# Patient Record
Sex: MALE | Race: BLACK OR AFRICAN AMERICAN | NOT HISPANIC OR LATINO | URBAN - METROPOLITAN AREA
[De-identification: names, ages, dates, MRNs, and addresses within clinical notes are randomized per-mention and may not be internally consistent; named-entity substitution may affect disease eponyms.]

---

## 2019-12-29 ENCOUNTER — INPATIENT (INPATIENT)
Facility: HOSPITAL | Age: 69
LOS: 1 days | Discharge: HOME | End: 2019-12-31
Attending: HOSPITALIST | Admitting: HOSPITALIST
Payer: MEDICAID

## 2019-12-29 VITALS
SYSTOLIC BLOOD PRESSURE: 172 MMHG | HEART RATE: 128 BPM | TEMPERATURE: 98 F | DIASTOLIC BLOOD PRESSURE: 82 MMHG | RESPIRATION RATE: 18 BRPM | OXYGEN SATURATION: 96 %

## 2019-12-29 DIAGNOSIS — N39.0 URINARY TRACT INFECTION, SITE NOT SPECIFIED: ICD-10-CM

## 2019-12-29 DIAGNOSIS — A41.9 SEPSIS, UNSPECIFIED ORGANISM: ICD-10-CM

## 2019-12-29 DIAGNOSIS — M62.82 RHABDOMYOLYSIS: ICD-10-CM

## 2019-12-29 DIAGNOSIS — J11.1 INFLUENZA DUE TO UNIDENTIFIED INFLUENZA VIRUS WITH OTHER RESPIRATORY MANIFESTATIONS: ICD-10-CM

## 2019-12-29 DIAGNOSIS — Z02.9 ENCOUNTER FOR ADMINISTRATIVE EXAMINATIONS, UNSPECIFIED: ICD-10-CM

## 2019-12-29 DIAGNOSIS — R74.0 NONSPECIFIC ELEVATION OF LEVELS OF TRANSAMINASE AND LACTIC ACID DEHYDROGENASE [LDH]: ICD-10-CM

## 2019-12-29 DIAGNOSIS — E11.9 TYPE 2 DIABETES MELLITUS WITHOUT COMPLICATIONS: ICD-10-CM

## 2019-12-29 DIAGNOSIS — N17.9 ACUTE KIDNEY FAILURE, UNSPECIFIED: ICD-10-CM

## 2019-12-29 DIAGNOSIS — G93.41 METABOLIC ENCEPHALOPATHY: ICD-10-CM

## 2019-12-29 DIAGNOSIS — R79.89 OTHER SPECIFIED ABNORMAL FINDINGS OF BLOOD CHEMISTRY: ICD-10-CM

## 2019-12-29 LAB
ALBUMIN SERPL ELPH-MCNC: 4.6 G/DL — SIGNIFICANT CHANGE UP (ref 3.3–5)
ALP SERPL-CCNC: 66 U/L — SIGNIFICANT CHANGE UP (ref 40–120)
ALT FLD-CCNC: 29 U/L — SIGNIFICANT CHANGE UP (ref 4–41)
AMMONIA BLD-MCNC: 30 UMOL/L — SIGNIFICANT CHANGE UP (ref 11–55)
ANION GAP SERPL CALC-SCNC: 16 MMO/L — HIGH (ref 7–14)
ANISOCYTOSIS BLD QL: SLIGHT — SIGNIFICANT CHANGE UP
APPEARANCE UR: CLEAR — SIGNIFICANT CHANGE UP
APTT BLD: 29.1 SEC — SIGNIFICANT CHANGE UP (ref 27.5–36.3)
AST SERPL-CCNC: 69 U/L — HIGH (ref 4–40)
B PERT DNA SPEC QL NAA+PROBE: NOT DETECTED — SIGNIFICANT CHANGE UP
BACTERIA # UR AUTO: NEGATIVE — SIGNIFICANT CHANGE UP
BASE EXCESS BLDV CALC-SCNC: 1.1 MMOL/L — SIGNIFICANT CHANGE UP
BASE EXCESS BLDV CALC-SCNC: 3.7 MMOL/L — SIGNIFICANT CHANGE UP
BASOPHILS # BLD AUTO: 0.02 K/UL — SIGNIFICANT CHANGE UP (ref 0–0.2)
BASOPHILS NFR BLD AUTO: 0.2 % — SIGNIFICANT CHANGE UP (ref 0–2)
BASOPHILS NFR SPEC: 0 % — SIGNIFICANT CHANGE UP (ref 0–2)
BILIRUB SERPL-MCNC: 1.7 MG/DL — HIGH (ref 0.2–1.2)
BILIRUB UR-MCNC: NEGATIVE — SIGNIFICANT CHANGE UP
BLASTS # FLD: 0 % — SIGNIFICANT CHANGE UP (ref 0–0)
BLOOD GAS VENOUS - CREATININE: 1.31 MG/DL — HIGH (ref 0.5–1.3)
BLOOD GAS VENOUS - CREATININE: 1.38 MG/DL — HIGH (ref 0.5–1.3)
BLOOD UR QL VISUAL: HIGH
BUN SERPL-MCNC: 22 MG/DL — SIGNIFICANT CHANGE UP (ref 7–23)
C PNEUM DNA SPEC QL NAA+PROBE: NOT DETECTED — SIGNIFICANT CHANGE UP
CALCIUM SERPL-MCNC: 10.3 MG/DL — SIGNIFICANT CHANGE UP (ref 8.4–10.5)
CHLORIDE BLDV-SCNC: 102 MMOL/L — SIGNIFICANT CHANGE UP (ref 96–108)
CHLORIDE BLDV-SCNC: 93 MMOL/L — LOW (ref 96–108)
CHLORIDE SERPL-SCNC: 95 MMOL/L — LOW (ref 98–107)
CK MB BLD-MCNC: 0.1 — SIGNIFICANT CHANGE UP (ref 0–2.5)
CK MB BLD-MCNC: 3.63 NG/ML — SIGNIFICANT CHANGE UP (ref 1–6.6)
CK SERPL-CCNC: 2425 U/L — HIGH (ref 30–200)
CO2 SERPL-SCNC: 25 MMOL/L — SIGNIFICANT CHANGE UP (ref 22–31)
COHGB MFR BLDV: 1 % — SIGNIFICANT CHANGE UP (ref 0.5–1.5)
COHGB MFR BLDV: 12 G/DL — LOW (ref 13–17)
COLOR SPEC: SIGNIFICANT CHANGE UP
CREAT SERPL-MCNC: 1.41 MG/DL — HIGH (ref 0.5–1.3)
EOSINOPHIL # BLD AUTO: 0 K/UL — SIGNIFICANT CHANGE UP (ref 0–0.5)
EOSINOPHIL NFR BLD AUTO: 0 % — SIGNIFICANT CHANGE UP (ref 0–6)
EOSINOPHIL NFR FLD: 0 % — SIGNIFICANT CHANGE UP (ref 0–6)
FLUAV H1 2009 PAND RNA SPEC QL NAA+PROBE: NOT DETECTED — SIGNIFICANT CHANGE UP
FLUAV H1 RNA SPEC QL NAA+PROBE: NOT DETECTED — SIGNIFICANT CHANGE UP
FLUAV H3 RNA SPEC QL NAA+PROBE: DETECTED — HIGH
FLUBV RNA SPEC QL NAA+PROBE: NOT DETECTED — SIGNIFICANT CHANGE UP
GAS PNL BLDV: 132 MMOL/L — LOW (ref 136–146)
GAS PNL BLDV: 135 MMOL/L — LOW (ref 136–146)
GIANT PLATELETS BLD QL SMEAR: PRESENT — SIGNIFICANT CHANGE UP
GLUCOSE BLDC GLUCOMTR-MCNC: 257 MG/DL — HIGH (ref 70–99)
GLUCOSE BLDV-MCNC: 346 MG/DL — HIGH (ref 70–99)
GLUCOSE BLDV-MCNC: 352 MG/DL — HIGH (ref 70–99)
GLUCOSE SERPL-MCNC: 345 MG/DL — HIGH (ref 70–99)
GLUCOSE UR-MCNC: >1000 — HIGH
HADV DNA SPEC QL NAA+PROBE: NOT DETECTED — SIGNIFICANT CHANGE UP
HCO3 BLDV-SCNC: 24 MMOL/L — SIGNIFICANT CHANGE UP (ref 20–27)
HCO3 BLDV-SCNC: 25 MMOL/L — SIGNIFICANT CHANGE UP (ref 20–27)
HCOV PNL SPEC NAA+PROBE: SIGNIFICANT CHANGE UP
HCT VFR BLD CALC: 41.1 % — SIGNIFICANT CHANGE UP (ref 39–50)
HCT VFR BLDV CALC: 37 % — LOW (ref 39–51)
HCT VFR BLDV CALC: 45.8 % — SIGNIFICANT CHANGE UP (ref 39–51)
HGB BLD-MCNC: 14.4 G/DL — SIGNIFICANT CHANGE UP (ref 13–17)
HGB BLDV-MCNC: 12 G/DL — LOW (ref 13–17)
HGB BLDV-MCNC: 14.9 G/DL — SIGNIFICANT CHANGE UP (ref 13–17)
HMPV RNA SPEC QL NAA+PROBE: NOT DETECTED — SIGNIFICANT CHANGE UP
HPIV1 RNA SPEC QL NAA+PROBE: NOT DETECTED — SIGNIFICANT CHANGE UP
HPIV2 RNA SPEC QL NAA+PROBE: NOT DETECTED — SIGNIFICANT CHANGE UP
HPIV3 RNA SPEC QL NAA+PROBE: NOT DETECTED — SIGNIFICANT CHANGE UP
HPIV4 RNA SPEC QL NAA+PROBE: NOT DETECTED — SIGNIFICANT CHANGE UP
HYALINE CASTS # UR AUTO: NEGATIVE — SIGNIFICANT CHANGE UP
IMM GRANULOCYTES NFR BLD AUTO: 0.6 % — SIGNIFICANT CHANGE UP (ref 0–1.5)
INR BLD: 1.18 — HIGH (ref 0.88–1.17)
KETONES UR-MCNC: SIGNIFICANT CHANGE UP
LACTATE BLDV-MCNC: 1.6 MMOL/L — SIGNIFICANT CHANGE UP (ref 0.5–2)
LACTATE BLDV-MCNC: 3.4 MMOL/L — HIGH (ref 0.5–2)
LEUKOCYTE ESTERASE UR-ACNC: NEGATIVE — SIGNIFICANT CHANGE UP
LYMPHOCYTES # BLD AUTO: 0.5 K/UL — LOW (ref 1–3.3)
LYMPHOCYTES # BLD AUTO: 4.9 % — LOW (ref 13–44)
LYMPHOCYTES NFR SPEC AUTO: 0 % — LOW (ref 13–44)
MACROCYTES BLD QL: SLIGHT — SIGNIFICANT CHANGE UP
MCHC RBC-ENTMCNC: 32.7 PG — SIGNIFICANT CHANGE UP (ref 27–34)
MCHC RBC-ENTMCNC: 35 % — SIGNIFICANT CHANGE UP (ref 32–36)
MCV RBC AUTO: 93.4 FL — SIGNIFICANT CHANGE UP (ref 80–100)
METAMYELOCYTES # FLD: 0 % — SIGNIFICANT CHANGE UP (ref 0–1)
METHGB MFR BLDV: 0.4 % — SIGNIFICANT CHANGE UP (ref 0–1.5)
MONOCYTES # BLD AUTO: 0.91 K/UL — HIGH (ref 0–0.9)
MONOCYTES NFR BLD AUTO: 8.9 % — SIGNIFICANT CHANGE UP (ref 2–14)
MONOCYTES NFR BLD: 3.5 % — SIGNIFICANT CHANGE UP (ref 2–9)
MYELOCYTES NFR BLD: 0 % — SIGNIFICANT CHANGE UP (ref 0–0)
NEUTROPHIL AB SER-ACNC: 87 % — HIGH (ref 43–77)
NEUTROPHILS # BLD AUTO: 8.77 K/UL — HIGH (ref 1.8–7.4)
NEUTROPHILS NFR BLD AUTO: 85.4 % — HIGH (ref 43–77)
NEUTS BAND # BLD: 6.9 % — HIGH (ref 0–6)
NITRITE UR-MCNC: POSITIVE — HIGH
NRBC # FLD: 0 K/UL — SIGNIFICANT CHANGE UP (ref 0–0)
OTHER - HEMATOLOGY %: 0 — SIGNIFICANT CHANGE UP
OXYHGB MFR BLDV: 43.1 % — LOW (ref 59–84)
PCO2 BLDV: 46 MMHG — SIGNIFICANT CHANGE UP (ref 41–51)
PCO2 BLDV: 48 MMHG — SIGNIFICANT CHANGE UP (ref 41–51)
PH BLDV: 7.37 PH — SIGNIFICANT CHANGE UP (ref 7.32–7.43)
PH BLDV: 7.39 PH — SIGNIFICANT CHANGE UP (ref 7.32–7.43)
PH UR: 6 — SIGNIFICANT CHANGE UP (ref 5–8)
PLATELET # BLD AUTO: 223 K/UL — SIGNIFICANT CHANGE UP (ref 150–400)
PLATELET COUNT - ESTIMATE: NORMAL — SIGNIFICANT CHANGE UP
PMV BLD: 10 FL — SIGNIFICANT CHANGE UP (ref 7–13)
PO2 BLDV: 26 MMHG — LOW (ref 35–40)
PO2 BLDV: < 24 MMHG — LOW (ref 35–40)
POLYCHROMASIA BLD QL SMEAR: SLIGHT — SIGNIFICANT CHANGE UP
POTASSIUM BLDV-SCNC: 3.8 MMOL/L — SIGNIFICANT CHANGE UP (ref 3.4–4.5)
POTASSIUM BLDV-SCNC: 3.9 MMOL/L — SIGNIFICANT CHANGE UP (ref 3.4–4.5)
POTASSIUM SERPL-MCNC: 3.9 MMOL/L — SIGNIFICANT CHANGE UP (ref 3.5–5.3)
POTASSIUM SERPL-SCNC: 3.9 MMOL/L — SIGNIFICANT CHANGE UP (ref 3.5–5.3)
PROMYELOCYTES # FLD: 0 % — SIGNIFICANT CHANGE UP (ref 0–0)
PROT SERPL-MCNC: 8.5 G/DL — HIGH (ref 6–8.3)
PROT UR-MCNC: 200 — HIGH
PROTHROM AB SERPL-ACNC: 13.2 SEC — HIGH (ref 9.8–13.1)
RBC # BLD: 4.4 M/UL — SIGNIFICANT CHANGE UP (ref 4.2–5.8)
RBC # FLD: 11.7 % — SIGNIFICANT CHANGE UP (ref 10.3–14.5)
RBC CASTS # UR COMP ASSIST: SIGNIFICANT CHANGE UP (ref 0–?)
RSV RNA SPEC QL NAA+PROBE: NOT DETECTED — SIGNIFICANT CHANGE UP
RV+EV RNA SPEC QL NAA+PROBE: NOT DETECTED — SIGNIFICANT CHANGE UP
SAO2 % BLDV: 27.4 % — LOW (ref 60–85)
SAO2 % BLDV: 43.7 % — LOW (ref 60–85)
SODIUM SERPL-SCNC: 136 MMOL/L — SIGNIFICANT CHANGE UP (ref 135–145)
SP GR SPEC: 1.02 — SIGNIFICANT CHANGE UP (ref 1–1.04)
SQUAMOUS # UR AUTO: SIGNIFICANT CHANGE UP
TROPONIN T, HIGH SENSITIVITY: 79 NG/L — CRITICAL HIGH (ref ?–14)
TROPONIN T, HIGH SENSITIVITY: 79 NG/L — CRITICAL HIGH (ref ?–14)
UROBILINOGEN FLD QL: NORMAL — SIGNIFICANT CHANGE UP
VARIANT LYMPHS # BLD: 2.6 % — SIGNIFICANT CHANGE UP
WBC # BLD: 10.26 K/UL — SIGNIFICANT CHANGE UP (ref 3.8–10.5)
WBC # FLD AUTO: 10.26 K/UL — SIGNIFICANT CHANGE UP (ref 3.8–10.5)
WBC UR QL: HIGH (ref 0–?)

## 2019-12-29 PROCEDURE — 70450 CT HEAD/BRAIN W/O DYE: CPT | Mod: 26

## 2019-12-29 PROCEDURE — 71046 X-RAY EXAM CHEST 2 VIEWS: CPT | Mod: 26

## 2019-12-29 RX ORDER — SODIUM CHLORIDE 9 MG/ML
1000 INJECTION INTRAMUSCULAR; INTRAVENOUS; SUBCUTANEOUS
Refills: 0 | Status: DISCONTINUED | OUTPATIENT
Start: 2019-12-29 | End: 2019-12-31

## 2019-12-29 RX ORDER — GLUCAGON INJECTION, SOLUTION 0.5 MG/.1ML
1 INJECTION, SOLUTION SUBCUTANEOUS ONCE
Refills: 0 | Status: DISCONTINUED | OUTPATIENT
Start: 2019-12-29 | End: 2019-12-31

## 2019-12-29 RX ORDER — DEXTROSE 50 % IN WATER 50 %
15 SYRINGE (ML) INTRAVENOUS ONCE
Refills: 0 | Status: DISCONTINUED | OUTPATIENT
Start: 2019-12-29 | End: 2019-12-31

## 2019-12-29 RX ORDER — INSULIN LISPRO 100/ML
VIAL (ML) SUBCUTANEOUS
Refills: 0 | Status: DISCONTINUED | OUTPATIENT
Start: 2019-12-29 | End: 2019-12-31

## 2019-12-29 RX ORDER — SODIUM CHLORIDE 9 MG/ML
2000 INJECTION INTRAMUSCULAR; INTRAVENOUS; SUBCUTANEOUS ONCE
Refills: 0 | Status: COMPLETED | OUTPATIENT
Start: 2019-12-29 | End: 2019-12-29

## 2019-12-29 RX ORDER — DEXTROSE 50 % IN WATER 50 %
25 SYRINGE (ML) INTRAVENOUS ONCE
Refills: 0 | Status: DISCONTINUED | OUTPATIENT
Start: 2019-12-29 | End: 2019-12-31

## 2019-12-29 RX ORDER — ACETAMINOPHEN 500 MG
650 TABLET ORAL ONCE
Refills: 0 | Status: COMPLETED | OUTPATIENT
Start: 2019-12-29 | End: 2019-12-29

## 2019-12-29 RX ORDER — SODIUM CHLORIDE 9 MG/ML
1000 INJECTION, SOLUTION INTRAVENOUS
Refills: 0 | Status: DISCONTINUED | OUTPATIENT
Start: 2019-12-29 | End: 2019-12-31

## 2019-12-29 RX ORDER — IBUPROFEN 200 MG
600 TABLET ORAL ONCE
Refills: 0 | Status: COMPLETED | OUTPATIENT
Start: 2019-12-29 | End: 2019-12-29

## 2019-12-29 RX ORDER — CEFTRIAXONE 500 MG/1
1000 INJECTION, POWDER, FOR SOLUTION INTRAMUSCULAR; INTRAVENOUS EVERY 24 HOURS
Refills: 0 | Status: DISCONTINUED | OUTPATIENT
Start: 2019-12-30 | End: 2019-12-31

## 2019-12-29 RX ORDER — INSULIN LISPRO 100/ML
VIAL (ML) SUBCUTANEOUS AT BEDTIME
Refills: 0 | Status: DISCONTINUED | OUTPATIENT
Start: 2019-12-29 | End: 2019-12-31

## 2019-12-29 RX ORDER — HEPARIN SODIUM 5000 [USP'U]/ML
5000 INJECTION INTRAVENOUS; SUBCUTANEOUS EVERY 8 HOURS
Refills: 0 | Status: DISCONTINUED | OUTPATIENT
Start: 2019-12-29 | End: 2019-12-31

## 2019-12-29 RX ORDER — DEXTROSE 50 % IN WATER 50 %
12.5 SYRINGE (ML) INTRAVENOUS ONCE
Refills: 0 | Status: DISCONTINUED | OUTPATIENT
Start: 2019-12-29 | End: 2019-12-31

## 2019-12-29 RX ORDER — VANCOMYCIN HCL 1 G
1000 VIAL (EA) INTRAVENOUS ONCE
Refills: 0 | Status: COMPLETED | OUTPATIENT
Start: 2019-12-29 | End: 2019-12-29

## 2019-12-29 RX ORDER — PIPERACILLIN AND TAZOBACTAM 4; .5 G/20ML; G/20ML
3.38 INJECTION, POWDER, LYOPHILIZED, FOR SOLUTION INTRAVENOUS ONCE
Refills: 0 | Status: COMPLETED | OUTPATIENT
Start: 2019-12-29 | End: 2019-12-29

## 2019-12-29 RX ADMIN — Medication 650 MILLIGRAM(S): at 15:24

## 2019-12-29 RX ADMIN — PIPERACILLIN AND TAZOBACTAM 200 GRAM(S): 4; .5 INJECTION, POWDER, LYOPHILIZED, FOR SOLUTION INTRAVENOUS at 15:24

## 2019-12-29 RX ADMIN — SODIUM CHLORIDE 100 MILLILITER(S): 9 INJECTION INTRAMUSCULAR; INTRAVENOUS; SUBCUTANEOUS at 23:59

## 2019-12-29 RX ADMIN — Medication 600 MILLIGRAM(S): at 21:13

## 2019-12-29 RX ADMIN — Medication 1: at 23:59

## 2019-12-29 RX ADMIN — Medication 600 MILLIGRAM(S): at 16:41

## 2019-12-29 RX ADMIN — SODIUM CHLORIDE 2000 MILLILITER(S): 9 INJECTION INTRAMUSCULAR; INTRAVENOUS; SUBCUTANEOUS at 15:24

## 2019-12-29 RX ADMIN — Medication 250 MILLIGRAM(S): at 15:44

## 2019-12-29 RX ADMIN — PIPERACILLIN AND TAZOBACTAM 3.38 GRAM(S): 4; .5 INJECTION, POWDER, LYOPHILIZED, FOR SOLUTION INTRAVENOUS at 16:42

## 2019-12-29 RX ADMIN — Medication 650 MILLIGRAM(S): at 16:42

## 2019-12-29 NOTE — ED ADULT TRIAGE NOTE - CHIEF COMPLAINT QUOTE
friend states " I spoke to him on Friday and was normal and on Saturday patient was not answering phone and went to see him last night and he appears confused and is deteriorating so called 911 today. patient alert ox2 name and place) Fs by . 20 Angiocath to right arm with saline bolus by EMS

## 2019-12-29 NOTE — ED PROVIDER NOTE - OBJECTIVE STATEMENT
69M h/o DM p/w AMS. per friends, pt was confused, had defecated and urinated on himself which is not new. was found on the floor beside the bed. pt denies being confused. + cough earlier this wk. pt reports he has been hiccuping over the past wk. denies f/c, cp/sob, abd pain, urianry symptoms. denies alcohol, drug use, no recent change in meds 69M h/o DM p/w AMS. per friends, pt was confused, had defecated and urinated on himself which is new. was found on the floor beside the bed. pt denies being confused. + cough earlier this wk. pt reports he has been hiccuping over the past wk. denies f/c, cp/sob, abd pain, urianry symptoms. denies alcohol, drug use, no recent change in meds

## 2019-12-29 NOTE — H&P ADULT - HISTORY OF PRESENT ILLNESS
69 M with PMH of DM presented with altered mental status for one day. Per daughter, patient was not responding to phone calls or messages so she went to visit him and found him confused by the side of the bed. He had defecated and urinated on himself. She had to help him up because he was so weak. He was disoriented and having slow speech at the time. Patient denies any falls but can't remember what had happened. He denies any lightheadedness or dizziness. No chest pain or shortness of breath. Patient has had a productive cough for past few days. No fevers at home, no sick contacts. Patient denies any abdominal pain, dysuria, hematuria, or diarrhea. Patient was doing well last week. No history of prior hospitalizations.     In the ED, patient was febrile to 104, , /71, RR 18 satting 100% on room air. Patient received 2L NS, vanc, zosyn, tylenol, and ibuprofen.

## 2019-12-29 NOTE — H&P ADULT - ASSESSMENT
69 M with PMH of DM presented with altered mental status found to be septic on admission 2/2 to Flu and UTI. 69 M with PMH of DM presented with altered mental status found to be septic on admission 2/2 to Flu and UTI. Also with elevated CK 2/2 to rhabdo, complicated by ROLF.

## 2019-12-29 NOTE — ED PROVIDER NOTE - PHYSICAL EXAMINATION
Vitals: febrile, tachy, remainder wnl  Gen: laying comfortably in NAD  Head: NCAT  ENT: sclerae white, anicterus, moist mucous membranes.  CV: RRR. Audible S1 and S2. No murmurs, rubs, gallops, S3, nor S4  Pulm: Clear to auscultation bilaterally. No wheezes, rales, or rhonchi  Abd: soft, normoactive BS x4, NTND, no rebound, no guarding, no rashes  Musculoskeletal:  No peripheral edema  Skin: no lesions or scars noted  Neurologic: AAOx4, motor extremities x4, sensation equal and intact extremities x4  : no CVA tenderness  Psych: no SI/HI Vitals: febrile, tachy, remainder wnl  Gen: laying comfortably in NAD  Head: NCAT  ENT: sclerae white, anicterus, moist mucous membranes.  CV: RRR. Audible S1 and S2. No murmurs, rubs, gallops, S3, nor S4  Pulm: Clear to auscultation bilaterally. No wheezes, rales, or rhonchi  Abd: soft, normoactive BS x4, NTND, no rebound, no guarding, no rashes  Musculoskeletal:  No peripheral edema  Skin: no lesions or scars noted  Neurologic: AAOx4, motor extremities x4, sensation equal and intact extremities x4  : no CVA tenderness  Psych: no SI/HI  haughey: tongue mild tremor on presentation, improved with time, no asterixs/ no hand tremor

## 2019-12-29 NOTE — ED PROVIDER NOTE - NS ED ROS FT
Gen: No fever, normal appetite  Eyes: No eye irritation or discharge  ENT: No earpain, congestion, sore throat  Resp: No cough or trouble breathing  Cardiovascular: No chest pain or palpitation  Gastroenteric:+urinary/fecal incontinence  : No dysuria  MS: No joint or muscle pain  Skin: No rashes  Neuro: +confusion  Remainder negative, except as per the HPI

## 2019-12-29 NOTE — H&P ADULT - NSHPLABSRESULTS_GEN_ALL_CORE
CBC Full  -  ( 29 Dec 2019 14:50 )  WBC Count : 10.26 K/uL  Hemoglobin : 14.4 g/dL  Hematocrit : 41.1 %  Platelet Count - Automated : 223 K/uL  Mean Cell Volume : 93.4 fL  Mean Cell Hemoglobin : 32.7 pg  Mean Cell Hemoglobin Concentration : 35.0 %  Auto Neutrophil # : 8.77 K/uL  Auto Lymphocyte # : 0.50 K/uL  Auto Monocyte # : 0.91 K/uL  Auto Eosinophil # : 0.00 K/uL  Auto Basophil # : 0.02 K/uL  Auto Neutrophil % : 85.4 %  Auto Lymphocyte % : 4.9 %  Auto Monocyte % : 8.9 %  Auto Eosinophil % : 0.0 %  Auto Basophil % : 0.2 %        136  |  95<L>  |  22  ----------------------------<  345<H>  3.9   |  25  |  1.41<H>    Ca    10.3      29 Dec 2019 14:54    TPro  8.5<H>  /  Alb  4.6  /  TBili  1.7<H>  /  DBili  x   /  AST  69<H>  /  ALT  29  /  AlkPhos  66  12-    LIVER FUNCTIONS - ( 29 Dec 2019 14:54 )  Alb: 4.6 g/dL / Pro: 8.5 g/dL / ALK PHOS: 66 u/L / ALT: 29 u/L / AST: 69 u/L / GGT: x           Urinalysis Basic - ( 29 Dec 2019 18:27 )    Color: LIGHT YELLOW / Appearance: CLEAR / S.025 / pH: 6.0  Gluc: >1000 / Ketone: SMALL  / Bili: NEGATIVE / Urobili: NORMAL   Blood: MODERATE / Protein: 200 / Nitrite: POSITIVE   Leuk Esterase: NEGATIVE / RBC: 3-5 / WBC 6-10   Sq Epi: OCC / Non Sq Epi: x / Bacteria: NEGATIVE      PT/INR - ( 29 Dec 2019 14:50 )   PT: 13.2 SEC;   INR: 1.18          PTT - ( 29 Dec 2019 14:50 )  PTT:29.1 SEC  26  < 24    Creatine Kinase, Serum: 2425 u/L (19 @ 14:54)  CKMB: 3.63 ng/mL (19 @ 14:54)  CKMB Relative Index: 0.1 (19 @ 14:54)      Rapid Respiratory Viral Panel (19 @ 14:57)    Adenovirus (RapRVP): Not Detected    Influenza AH1 2009 (RapRVP): Detected    Influenza AH1 (RapRVP): Not Detected    Influenza AH3 (RapRVP): Not Detected    Influenza B (RapRVP): Not Detected    Parainfluenza 1 (RapRVP): Not Detected    Parainfluenza 2 (RapRVP): Not Detected    Parainfluenza 3 (RapRVP): Not Detected    Parainfluenza 4 (RapRVP): Not Detected    Resp Syncytial Virus (RapRVP): Not Detected    Chlamydia pneumoniae (RapRVP): Not Detected    Mycoplasma pneumoniae (RapRVP): Not Detected    Entero/Rhinovirus (RapRVP): Not Detected    hMPV (RapRVP): Not Detected    Coronavirus (229E,HKU1,NL63,OC43): Not Detected This Respiratory Panel uses polymerase chain reaction (PCR)  to detect for adenovirus; coronavirus (HKU1, NL63, 229E,  OC43); human metapneumovirus (hMPV); human  enterovirus/rhinovirus (Entero/RV); influenza A; influenza  A/H1: influenza A/H3; influenza A/H1-2009; influenza B;  parainfluenza viruses 1,2,3,4; respiratory syncytial virus;  Mycoplasma pneumoniae; and Chlamydophila pneumoniae.    < from: CT Head No Cont (19 @ 19:40) >    IMPRESSION:    No acute intracranial hemorrhage, mass effect, or CT evidence of a large vascular territory infarct.    If there are new or persistent symptoms, consider further evaluation with MRI provided no contraindications.    < end of copied text > CBC Full  -  ( 29 Dec 2019 14:50 )  WBC Count : 10.26 K/uL  Hemoglobin : 14.4 g/dL  Hematocrit : 41.1 %  Platelet Count - Automated : 223 K/uL  Mean Cell Volume : 93.4 fL  Mean Cell Hemoglobin : 32.7 pg  Mean Cell Hemoglobin Concentration : 35.0 %  Auto Neutrophil # : 8.77 K/uL  Auto Lymphocyte # : 0.50 K/uL  Auto Monocyte # : 0.91 K/uL  Auto Eosinophil # : 0.00 K/uL  Auto Basophil # : 0.02 K/uL  Auto Neutrophil % : 85.4 %  Auto Lymphocyte % : 4.9 %  Auto Monocyte % : 8.9 %  Auto Eosinophil % : 0.0 %  Auto Basophil % : 0.2 %        136  |  95<L>  |  22  ----------------------------<  345<H>  3.9   |  25  |  1.41<H>    Ca    10.3      29 Dec 2019 14:54    TPro  8.5<H>  /  Alb  4.6  /  TBili  1.7<H>  /  DBili  x   /  AST  69<H>  /  ALT  29  /  AlkPhos  66  12-    LIVER FUNCTIONS - ( 29 Dec 2019 14:54 )  Alb: 4.6 g/dL / Pro: 8.5 g/dL / ALK PHOS: 66 u/L / ALT: 29 u/L / AST: 69 u/L / GGT: x           Urinalysis Basic - ( 29 Dec 2019 18:27 )    Color: LIGHT YELLOW / Appearance: CLEAR / S.025 / pH: 6.0  Gluc: >1000 / Ketone: SMALL  / Bili: NEGATIVE / Urobili: NORMAL   Blood: MODERATE / Protein: 200 / Nitrite: POSITIVE   Leuk Esterase: NEGATIVE / RBC: 3-5 / WBC 6-10   Sq Epi: OCC / Non Sq Epi: x / Bacteria: NEGATIVE      PT/INR - ( 29 Dec 2019 14:50 )   PT: 13.2 SEC;   INR: 1.18          PTT - ( 29 Dec 2019 14:50 )  PTT:29.1 SEC  26  < 24    Creatine Kinase, Serum: 2425 u/L (19 @ 14:54)  CKMB: 3.63 ng/mL (19 @ 14:54)  CKMB Relative Index: 0.1 (19 @ 14:54)      Rapid Respiratory Viral Panel (19 @ 14:57)    Adenovirus (RapRVP): Not Detected    Influenza AH1 2009 (RapRVP): Detected    Influenza AH1 (RapRVP): Not Detected    Influenza AH3 (RapRVP): Not Detected    Influenza B (RapRVP): Not Detected    Parainfluenza 1 (RapRVP): Not Detected    Parainfluenza 2 (RapRVP): Not Detected    Parainfluenza 3 (RapRVP): Not Detected    Parainfluenza 4 (RapRVP): Not Detected    Resp Syncytial Virus (RapRVP): Not Detected    Chlamydia pneumoniae (RapRVP): Not Detected    Mycoplasma pneumoniae (RapRVP): Not Detected    Entero/Rhinovirus (RapRVP): Not Detected    hMPV (RapRVP): Not Detected    Coronavirus (229E,HKU1,NL63,OC43): Not Detected This Respiratory Panel uses polymerase chain reaction (PCR)  to detect for adenovirus; coronavirus (HKU1, NL63, 229E,  OC43); human metapneumovirus (hMPV); human  enterovirus/rhinovirus (Entero/RV); influenza A; influenza  A/H1: influenza A/H3; influenza A/H1-2009; influenza B;  parainfluenza viruses 1,2,3,4; respiratory syncytial virus;  Mycoplasma pneumoniae; and Chlamydophila pneumoniae.    < from: CT Head No Cont (19 @ 19:40) >    IMPRESSION:    No acute intracranial hemorrhage, mass effect, or CT evidence of a large vascular territory infarct.    If there are new or persistent symptoms, consider further evaluation with MRI provided no contraindications.    < end of copied text >    EKG: sinus tachycardia CBC Full  -  ( 29 Dec 2019 14:50 )  WBC Count : 10.26 K/uL  Hemoglobin : 14.4 g/dL  Hematocrit : 41.1 %  Platelet Count - Automated : 223 K/uL  Mean Cell Volume : 93.4 fL  Mean Cell Hemoglobin : 32.7 pg  Mean Cell Hemoglobin Concentration : 35.0 %  Auto Neutrophil # : 8.77 K/uL  Auto Lymphocyte # : 0.50 K/uL  Auto Monocyte # : 0.91 K/uL  Auto Eosinophil # : 0.00 K/uL  Auto Basophil # : 0.02 K/uL  Auto Neutrophil % : 85.4 %  Auto Lymphocyte % : 4.9 %  Auto Monocyte % : 8.9 %  Auto Eosinophil % : 0.0 %  Auto Basophil % : 0.2 %        136  |  95<L>  |  22  ----------------------------<  345<H>  3.9   |  25  |  1.41<H>    Ca    10.3      29 Dec 2019 14:54    TPro  8.5<H>  /  Alb  4.6  /  TBili  1.7<H>  /  DBili  x   /  AST  69<H>  /  ALT  29  /  AlkPhos  66  12-    LIVER FUNCTIONS - ( 29 Dec 2019 14:54 )  Alb: 4.6 g/dL / Pro: 8.5 g/dL / ALK PHOS: 66 u/L / ALT: 29 u/L / AST: 69 u/L / GGT: x           Urinalysis Basic - ( 29 Dec 2019 18:27 )    Color: LIGHT YELLOW / Appearance: CLEAR / S.025 / pH: 6.0  Gluc: >1000 / Ketone: SMALL  / Bili: NEGATIVE / Urobili: NORMAL   Blood: MODERATE / Protein: 200 / Nitrite: POSITIVE   Leuk Esterase: NEGATIVE / RBC: 3-5 / WBC 6-10   Sq Epi: OCC / Non Sq Epi: x / Bacteria: NEGATIVE      PT/INR - ( 29 Dec 2019 14:50 )   PT: 13.2 SEC;   INR: 1.18          PTT - ( 29 Dec 2019 14:50 )  PTT:29.1 SEC  26  < 24    Creatine Kinase, Serum: 2425 u/L (19 @ 14:54)  CKMB: 3.63 ng/mL (19 @ 14:54)  CKMB Relative Index: 0.1 (19 @ 14:54)      Rapid Respiratory Viral Panel (19 @ 14:57)    Adenovirus (RapRVP): Not Detected    Influenza AH1 2009 (RapRVP): Detected    Influenza AH1 (RapRVP): Not Detected    Influenza AH3 (RapRVP): Not Detected    Influenza B (RapRVP): Not Detected    Parainfluenza 1 (RapRVP): Not Detected    Parainfluenza 2 (RapRVP): Not Detected    Parainfluenza 3 (RapRVP): Not Detected    Parainfluenza 4 (RapRVP): Not Detected    Resp Syncytial Virus (RapRVP): Not Detected    Chlamydia pneumoniae (RapRVP): Not Detected    Mycoplasma pneumoniae (RapRVP): Not Detected    Entero/Rhinovirus (RapRVP): Not Detected    hMPV (RapRVP): Not Detected    Coronavirus (229E,HKU1,NL63,OC43): Not Detected This Respiratory Panel uses polymerase chain reaction (PCR)  to detect for adenovirus; coronavirus (HKU1, NL63, 229E,  OC43); human metapneumovirus (hMPV); human  enterovirus/rhinovirus (Entero/RV); influenza A; influenza  A/H1: influenza A/H3; influenza A/H1-2009; influenza B;  parainfluenza viruses 1,2,3,4; respiratory syncytial virus;  Mycoplasma pneumoniae; and Chlamydophila pneumoniae.    < from: CT Head No Cont (19 @ 19:40) >    IMPRESSION:    No acute intracranial hemorrhage, mass effect, or CT evidence of a large vascular territory infarct.    If there are new or persistent symptoms, consider further evaluation with MRI provided no contraindications.    < end of copied text >    EKG: sinus tachycardia . No ST/T wave abnormalities. .

## 2019-12-29 NOTE — H&P ADULT - PROBLEM SELECTOR PLAN 5
- patient with ROLF with Cr 1.42 most likely 2/2 to infection vs rhabdo   - continue fluids  - monitor Cr - patient with ROLF with Cr 1.42 most likely 2/2 to infection vs rhabdo  - continue fluids, abx for UTI  - monitor Cr

## 2019-12-29 NOTE — H&P ADULT - PROBLEM SELECTOR PLAN 8
- elevated T.bili to 1.7, AST elevated to 69, most likely 2/2 sepsis  - no concern for intraabdominal infection  - continue IVF and trend

## 2019-12-29 NOTE — ED ADULT NURSE REASSESSMENT NOTE - NS ED NURSE REASSESS COMMENT FT1
Pt resting comfortably in bed at this time. Pt A&Ox4 and appears to be less confused than before upon conversation. Tele tech alerted for 4 beats of VTACH on the monitor. MD made aware. No orders or interventions at this time. Pt breathing even and unlabored, talking with family at this time. Sinus tachycardia on the monitor. VS as noted. Medications given as ordered. Rectal 102.2F. Denies chest pain, SOB, headache, and dizziness. Will continue to monitor.

## 2019-12-29 NOTE — H&P ADULT - PROBLEM SELECTOR PLAN 10
- DVT ppx: HSQ  - Diet: DASH/CC  - Dispo: pending    Transitions of Care Status:  1.  Name of PCP: Dr. Quirino Pena  2.  PCP Contacted on Admission: [ ] Y    [x ] N    3.  PCP contacted at Discharge: [ ] Y    [ ] N    [ ] N/A  4.  Post-Discharge Appointment Date and Location:  5.  Summary of Handoff given to PCP:

## 2019-12-29 NOTE — H&P ADULT - PROBLEM SELECTOR PLAN 1
- patient presented with altered mental status most likely 2/2 to infection. No acute findings on CTH. Mental status resolved, s/p fluids, abx, now back at baseline  - - patient presented with altered mental status most likely 2/2 to infection. Mental status resolved, s/p fluids, abx, now back at baseline  - No acute findings on CTH. No significant electrolyte imbalances  - UA positive for UTI, RVP positive for influenza  - continue fluids, tamiflu, abx for UTI

## 2019-12-29 NOTE — H&P ADULT - PROBLEM SELECTOR PLAN 9
- patient on metformin at home  - FS, ISS premeal, qhs  - f/u A1c    HTN  - on labetalol at home, will need to verify dosage  - continue to monitor in the setting of sepsis     Medication management  - complete med rec

## 2019-12-29 NOTE — ED PROVIDER NOTE - ATTENDING CONTRIBUTION TO CARE
I performed a history and physical exam of the patient and discussed their management with the resident and /or advanced care provider. I reviewed the resident and /or ACP's note and agree with the documented findings and plan of care. My medical decison making and observations are found above.    Marce:  70 yo former  (retired) who presents with altered ms.  although he is oriented x 3 he is word searching and having trouble with focus.  denies being a drinker or using drugs, is DM was found on floor by family friend after he didn't answer phone for full day.  when asked why he was on the floor pt says "sometimes I like to be on the floor for comfort".  he states he had a "flu episode" 3 weeks ago. but later states he did not.  has a waxing and waning comprehension.  febrile rectally to 104.  well spoken, but confused/ confabulating, moves all extremities, lungs clear, abd soft no r/g/t, pupils normal, eomi intact, no jaundice, denies hx hepatitis or sz. has stooled and urinated on himself, seems unaware.    pt with delerium.  glucose high, febrile, will need eval for source, also copk likely high given found on floor.  ct pending, chest film no inf, awaiting urine as well when signout at 1600, Dr. Dickerson.  anticipate admission

## 2019-12-29 NOTE — H&P ADULT - NSHPPHYSICALEXAM_GEN_ALL_CORE
PHYSICAL EXAM:    Vital Signs Last 24 Hrs  T(C): 36.8 (29 Dec 2019 20:02), Max: 40 (29 Dec 2019 15:25)  T(F): 98.3 (29 Dec 2019 20:02), Max: 104 (29 Dec 2019 15:25)  HR: 95 (29 Dec 2019 20:02) (95 - 128)  BP: 134/82 (29 Dec 2019 20:02) (126/65 - 172/82)  BP(mean): --  RR: 18 (29 Dec 2019 20:02) (18 - 18)  SpO2: 100% (29 Dec 2019 20:02) (96% - 100%)    General: No acute distress.  HEENT: NCAT.  PERRL. No scleral icterus or injection.  Moist MM.  No oropharyngeal exudates.    Neck: Supple.  Full ROM.  No JVD.  Heart: RRR.  Normal S1 and S2.  No murmurs, rubs, or gallops.   Lungs: Crackles at the RLL base, no wheezing. L lung clear.   Abdomen: BS+, soft, NT/ND.   Skin: Warm and dry.  No rashes.  Extremities: No edema, clubbing, or cyanosis.  2+ peripheral pulses b/l.  Musculoskeletal: No deformities.  Neuro: A&Ox3. 5/5 strength in UE and LE b/l. Following commands, moving all extremities

## 2019-12-29 NOTE — H&P ADULT - NSHPREVIEWOFSYSTEMS_GEN_ALL_CORE
REVIEW OF SYSTEMS:    CONSTITUTIONAL: No weakness, fevers or chills  EYES/ENT: No visual changes;  No vertigo or throat pain   NECK: No pain or stiffness  RESPIRATORY: No cough, wheezing, hemoptysis; No shortness of breath  CARDIOVASCULAR: No chest pain or palpitations  GASTROINTESTINAL: No abdominal pain; nausea, vomiting;  diarrhea or constipation. No hemetemesis, melena or hematochezia.  GENITOURINARY: No dysuria, frequency or hematuria  NEUROLOGICAL: No numbness or weakness  SKIN: No itching, burning, rashes, or lesions   All other review of systems is negative unless indicated above. REVIEW OF SYSTEMS:    CONSTITUTIONAL: + generalized weakness, no fevers or chills  EYES/ENT: No visual changes;  No vertigo or throat pain   NECK: No pain or stiffness  RESPIRATORY: see HPI  CARDIOVASCULAR: No chest pain or palpitations  GASTROINTESTINAL: No abdominal pain; nausea, vomiting;  diarrhea or constipation. No hemetemesis, melena or hematochezia.  GENITOURINARY: No dysuria, frequency or hematuria  NEUROLOGICAL: No numbness or weakness  SKIN: No itching, burning, rashes, or lesions   MUSCULOSKEL: no pain   All other review of systems is negative unless indicated above.

## 2019-12-29 NOTE — ED PROVIDER NOTE - CLINICAL SUMMARY MEDICAL DECISION MAKING FREE TEXT BOX
Marce:  68 yo former  (retired) who presents with altered ms.  although he is oriented x 3 he is word searching and having trouble with focus.  denies being a drinker or using drugs, is DM was found on floor by family friend when asked why he was on the floor pt says "sometimes I like to be on the floor for comfort".  he states he had a "flu episode" 3 weeks ago. Marce:  70 yo former  (retired) who presents with altered ms.  although he is oriented x 3 he is word searching and having trouble with focus.  denies being a drinker or using drugs, is DM was found on floor by family friend after he didn't answer phone for full day.  when asked why he was on the floor pt says "sometimes I like to be on the floor for comfort".  he states he had a "flu episode" 3 weeks ago. but later states he did not.  has a waxing and waning comprehension.  febrile rectally to 104.  well spoken, but confused/ confabulating, moves all extremities, lungs clear, abd soft no r/g/t, pupils normal, eomi intact, no jaundice, denies hx hepatitis or sz. has stooled and urinated on himself, seems unaware.    pt with delerium.  glucose high, febrile, will need eval for source, also copk likely high given found on floor.  ct pending, chest film no inf, awaiting urine as well when signout at 1600, Dr. Dickerson.  anticipate admission

## 2019-12-29 NOTE — ED ADULT NURSE NOTE - OBJECTIVE STATEMENT
Pt is a 69yr old male, A&Ox3 and ambulatory, complaining of altered mental status as per family friend. Pt was last known well on Thursday night, pt did not respond to family phone calls all day on Saturday and was found on the floor earlier this morning. Pt does not remember falling. Pt appears confused throughout interview. Pt noted to be shakey. Equal  strength bilaterally. No noted arm or leg drift in all extremities. No facial droop or slurred speech noted. Possible left eye gaze, however unknown of pt baseline. Pt noted to have multiple episodes of voiding, continent at baseline. No noted trauma to the head or rest of body. Rectal temp 104.0F. Skin intact. Denies chest pain, SOB, headache, dizziness, N/V, and abd. pain at this time. Sinus tachycardia on the monitor. MD at bedside. VS as noted. 20 gauge in left AC. EMS 20 gauge in right forearm. Labs sent. Will continue to monitor.

## 2019-12-29 NOTE — ED PROVIDER NOTE - PROGRESS NOTE DETAILS
pt was reexamined by me, appears well on exam, initially came in septic- flu+, also with urinary tract infection, given tamiflu, abx, fluids, overall appears well but was found on the ground this morning disoriented by daughter, stable for admission  Arti Conway, PGY-3 EM

## 2019-12-29 NOTE — H&P ADULT - PROBLEM SELECTOR PLAN 2
- patient febrile, tachycardic on admission 2/2 to influenza and UTI. No leukocytosis, however 6.9% bands. Elevated lactate of 3.4 on admission, downtrended to 1.6  - UCx, blood cultures pending  - continue treatment for UTI and Flu  - continue fluids

## 2019-12-29 NOTE — H&P ADULT - NSHPSOCIALHISTORY_GEN_ALL_CORE
Patient smoked 1-2 years during his teenage years. No alcohol use. Patient is a retired . Lives at home alone. He is active, uses treadmill at home. Ambulatory without assistance.

## 2019-12-29 NOTE — ED ADULT NURSE NOTE - NSIMPLEMENTINTERV_GEN_ALL_ED
Implemented All Fall Risk Interventions:  [place] to call system. Call bell, personal items and telephone within reach. Instruct patient to call for assistance. Room bathroom lighting operational. Non-slip footwear when patient is off stretcher. Physically safe environment: no spills, clutter or unnecessary equipment. Stretcher in lowest position, wheels locked, appropriate side rails in place. Provide visual cue, wrist band, yellow gown, etc. Monitor gait and stability. Monitor for mental status changes and reorient to person, place, and time. Review medications for side effects contributing to fall risk. Reinforce activity limits and safety measures with patient and family.

## 2019-12-29 NOTE — H&P ADULT - PROBLEM SELECTOR PLAN 6
- patient with elevated CK to 2425, UA with moderate flood, few RBC most likely 2/2 to immobility  - continue hydration with IVF  - trend CK

## 2019-12-29 NOTE — ED ADULT NURSE REASSESSMENT NOTE - NS ED NURSE REASSESS COMMENT FT1
Break RN: Received report from SLICK Ewing. Pt AxOx3, presenting with positive ROM in upper and lower extremities. Pt appears comfortable with breathing even and unlabored. Pt denies CP, SOB, N/V. Pt verbalizing improvement in breathing since being in ED. Repeat trop sent. Pt sinus tachy on monitor at this time. Pt awaiting CT. MD at bedside, will continue to monitor.

## 2019-12-29 NOTE — ED ADULT NURSE REASSESSMENT NOTE - NS ED NURSE REASSESS COMMENT FT1
Pt resting comfortably in bed at this time, family present. Pt A&Ox4 at this time, family reports that pt has "basically returned to his baseline mental status". Breathing even and unlabored. Educated pt and pt family on droplet precautions due to Flu detection. VS as noted. NSR on the cardiac monitor. Denies chest pain, SOB, headache, and dizziness. Will continue to monitor.

## 2019-12-29 NOTE — H&P ADULT - PROBLEM SELECTOR PROBLEM 5
refused post void bladder scan - MD aware, refused to sign d/c papers, extra diapers provided Diabetes mellitus ROLF (acute kidney injury)

## 2019-12-30 LAB
ALBUMIN SERPL ELPH-MCNC: 3.2 G/DL — LOW (ref 3.3–5)
ALP SERPL-CCNC: 48 U/L — SIGNIFICANT CHANGE UP (ref 40–120)
ALT FLD-CCNC: 34 U/L — SIGNIFICANT CHANGE UP (ref 4–41)
ANION GAP SERPL CALC-SCNC: 9 MMO/L — SIGNIFICANT CHANGE UP (ref 7–14)
AST SERPL-CCNC: 89 U/L — HIGH (ref 4–40)
BASOPHILS # BLD AUTO: 0.01 K/UL — SIGNIFICANT CHANGE UP (ref 0–0.2)
BASOPHILS NFR BLD AUTO: 0.1 % — SIGNIFICANT CHANGE UP (ref 0–2)
BILIRUB SERPL-MCNC: 0.9 MG/DL — SIGNIFICANT CHANGE UP (ref 0.2–1.2)
BUN SERPL-MCNC: 21 MG/DL — SIGNIFICANT CHANGE UP (ref 7–23)
CALCIUM SERPL-MCNC: 8.8 MG/DL — SIGNIFICANT CHANGE UP (ref 8.4–10.5)
CHLORIDE SERPL-SCNC: 103 MMOL/L — SIGNIFICANT CHANGE UP (ref 98–107)
CK SERPL-CCNC: 3183 U/L — HIGH (ref 30–200)
CO2 SERPL-SCNC: 23 MMOL/L — SIGNIFICANT CHANGE UP (ref 22–31)
CREAT SERPL-MCNC: 1.16 MG/DL — SIGNIFICANT CHANGE UP (ref 0.5–1.3)
EOSINOPHIL # BLD AUTO: 0 K/UL — SIGNIFICANT CHANGE UP (ref 0–0.5)
EOSINOPHIL NFR BLD AUTO: 0 % — SIGNIFICANT CHANGE UP (ref 0–6)
GLUCOSE BLDC GLUCOMTR-MCNC: 222 MG/DL — HIGH (ref 70–99)
GLUCOSE SERPL-MCNC: 246 MG/DL — HIGH (ref 70–99)
HBA1C BLD-MCNC: 7.3 % — HIGH (ref 4–5.6)
HCT VFR BLD CALC: 35.6 % — LOW (ref 39–50)
HCV AB S/CO SERPL IA: 0.1 S/CO — SIGNIFICANT CHANGE UP (ref 0–0.99)
HCV AB SERPL-IMP: SIGNIFICANT CHANGE UP
HGB BLD-MCNC: 12.6 G/DL — LOW (ref 13–17)
IMM GRANULOCYTES NFR BLD AUTO: 0.3 % — SIGNIFICANT CHANGE UP (ref 0–1.5)
LYMPHOCYTES # BLD AUTO: 1.29 K/UL — SIGNIFICANT CHANGE UP (ref 1–3.3)
LYMPHOCYTES # BLD AUTO: 14.6 % — SIGNIFICANT CHANGE UP (ref 13–44)
MAGNESIUM SERPL-MCNC: 1.6 MG/DL — SIGNIFICANT CHANGE UP (ref 1.6–2.6)
MANUAL SMEAR VERIFICATION: SIGNIFICANT CHANGE UP
MCHC RBC-ENTMCNC: 32.7 PG — SIGNIFICANT CHANGE UP (ref 27–34)
MCHC RBC-ENTMCNC: 35.4 % — SIGNIFICANT CHANGE UP (ref 32–36)
MCV RBC AUTO: 92.5 FL — SIGNIFICANT CHANGE UP (ref 80–100)
MONOCYTES # BLD AUTO: 0.71 K/UL — SIGNIFICANT CHANGE UP (ref 0–0.9)
MONOCYTES NFR BLD AUTO: 8 % — SIGNIFICANT CHANGE UP (ref 2–14)
NEUTROPHILS # BLD AUTO: 6.8 K/UL — SIGNIFICANT CHANGE UP (ref 1.8–7.4)
NEUTROPHILS NFR BLD AUTO: 77 % — SIGNIFICANT CHANGE UP (ref 43–77)
NRBC # FLD: 0 K/UL — SIGNIFICANT CHANGE UP (ref 0–0)
PHOSPHATE SERPL-MCNC: 1.5 MG/DL — LOW (ref 2.5–4.5)
PLATELET # BLD AUTO: 180 K/UL — SIGNIFICANT CHANGE UP (ref 150–400)
PMV BLD: 9.8 FL — SIGNIFICANT CHANGE UP (ref 7–13)
POTASSIUM SERPL-MCNC: 3.9 MMOL/L — SIGNIFICANT CHANGE UP (ref 3.5–5.3)
POTASSIUM SERPL-SCNC: 3.9 MMOL/L — SIGNIFICANT CHANGE UP (ref 3.5–5.3)
PROT SERPL-MCNC: 6.3 G/DL — SIGNIFICANT CHANGE UP (ref 6–8.3)
RBC # BLD: 3.85 M/UL — LOW (ref 4.2–5.8)
RBC # FLD: 11.8 % — SIGNIFICANT CHANGE UP (ref 10.3–14.5)
SODIUM SERPL-SCNC: 135 MMOL/L — SIGNIFICANT CHANGE UP (ref 135–145)
SPECIMEN SOURCE: SIGNIFICANT CHANGE UP
WBC # BLD: 8.84 K/UL — SIGNIFICANT CHANGE UP (ref 3.8–10.5)
WBC # FLD AUTO: 8.84 K/UL — SIGNIFICANT CHANGE UP (ref 3.8–10.5)

## 2019-12-30 PROCEDURE — 12345: CPT | Mod: NC,GC

## 2019-12-30 PROCEDURE — 99223 1ST HOSP IP/OBS HIGH 75: CPT | Mod: GC

## 2019-12-30 RX ORDER — SODIUM CHLORIDE 9 MG/ML
1000 INJECTION, SOLUTION INTRAVENOUS ONCE
Refills: 0 | Status: COMPLETED | OUTPATIENT
Start: 2019-12-30 | End: 2019-12-30

## 2019-12-30 RX ORDER — GLIMEPIRIDE 1 MG
1 TABLET ORAL
Qty: 0 | Refills: 0 | DISCHARGE

## 2019-12-30 RX ORDER — ALPRAZOLAM 0.25 MG
2 TABLET ORAL
Qty: 0 | Refills: 0 | DISCHARGE

## 2019-12-30 RX ORDER — LABETALOL HCL 100 MG
200 TABLET ORAL
Refills: 0 | Status: DISCONTINUED | OUTPATIENT
Start: 2019-12-30 | End: 2019-12-31

## 2019-12-30 RX ORDER — METFORMIN HYDROCHLORIDE 850 MG/1
0 TABLET ORAL
Qty: 0 | Refills: 0 | DISCHARGE

## 2019-12-30 RX ORDER — LABETALOL HCL 100 MG
200 TABLET ORAL
Qty: 0 | Refills: 0 | DISCHARGE

## 2019-12-30 RX ORDER — LABETALOL HCL 100 MG
0 TABLET ORAL
Qty: 0 | Refills: 0 | DISCHARGE

## 2019-12-30 RX ORDER — METFORMIN HYDROCHLORIDE 850 MG/1
1 TABLET ORAL
Qty: 0 | Refills: 0 | DISCHARGE

## 2019-12-30 RX ORDER — SODIUM CHLORIDE 9 MG/ML
1000 INJECTION INTRAMUSCULAR; INTRAVENOUS; SUBCUTANEOUS
Refills: 0 | Status: COMPLETED | OUTPATIENT
Start: 2019-12-30 | End: 2019-12-30

## 2019-12-30 RX ORDER — POTASSIUM PHOSPHATE, MONOBASIC POTASSIUM PHOSPHATE, DIBASIC 236; 224 MG/ML; MG/ML
15 INJECTION, SOLUTION INTRAVENOUS ONCE
Refills: 0 | Status: COMPLETED | OUTPATIENT
Start: 2019-12-30 | End: 2019-12-30

## 2019-12-30 RX ORDER — ACETAMINOPHEN 500 MG
650 TABLET ORAL EVERY 6 HOURS
Refills: 0 | Status: DISCONTINUED | OUTPATIENT
Start: 2019-12-30 | End: 2019-12-31

## 2019-12-30 RX ADMIN — Medication 2: at 13:05

## 2019-12-30 RX ADMIN — Medication 200 MILLIGRAM(S): at 17:23

## 2019-12-30 RX ADMIN — SODIUM CHLORIDE 100 MILLILITER(S): 9 INJECTION INTRAMUSCULAR; INTRAVENOUS; SUBCUTANEOUS at 22:58

## 2019-12-30 RX ADMIN — SODIUM CHLORIDE 1000 MILLILITER(S): 9 INJECTION, SOLUTION INTRAVENOUS at 09:07

## 2019-12-30 RX ADMIN — CEFTRIAXONE 100 MILLIGRAM(S): 500 INJECTION, POWDER, FOR SOLUTION INTRAMUSCULAR; INTRAVENOUS at 22:57

## 2019-12-30 RX ADMIN — SODIUM CHLORIDE 100 MILLILITER(S): 9 INJECTION INTRAMUSCULAR; INTRAVENOUS; SUBCUTANEOUS at 21:39

## 2019-12-30 RX ADMIN — CEFTRIAXONE 100 MILLIGRAM(S): 500 INJECTION, POWDER, FOR SOLUTION INTRAMUSCULAR; INTRAVENOUS at 00:00

## 2019-12-30 RX ADMIN — HEPARIN SODIUM 5000 UNIT(S): 5000 INJECTION INTRAVENOUS; SUBCUTANEOUS at 21:39

## 2019-12-30 RX ADMIN — Medication 30 MILLIGRAM(S): at 12:08

## 2019-12-30 RX ADMIN — Medication 30 MILLIGRAM(S): at 00:12

## 2019-12-30 RX ADMIN — Medication 2: at 09:00

## 2019-12-30 RX ADMIN — Medication 650 MILLIGRAM(S): at 22:30

## 2019-12-30 RX ADMIN — HEPARIN SODIUM 5000 UNIT(S): 5000 INJECTION INTRAVENOUS; SUBCUTANEOUS at 06:26

## 2019-12-30 RX ADMIN — Medication 650 MILLIGRAM(S): at 21:39

## 2019-12-30 RX ADMIN — Medication 3: at 18:22

## 2019-12-30 RX ADMIN — SODIUM CHLORIDE 100 MILLILITER(S): 9 INJECTION INTRAMUSCULAR; INTRAVENOUS; SUBCUTANEOUS at 13:28

## 2019-12-30 RX ADMIN — POTASSIUM PHOSPHATE, MONOBASIC POTASSIUM PHOSPHATE, DIBASIC 62.5 MILLIMOLE(S): 236; 224 INJECTION, SOLUTION INTRAVENOUS at 08:59

## 2019-12-30 RX ADMIN — HEPARIN SODIUM 5000 UNIT(S): 5000 INJECTION INTRAVENOUS; SUBCUTANEOUS at 13:05

## 2019-12-30 NOTE — DISCHARGE NOTE PROVIDER - CARE PROVIDER_API CALL
Quirino Pena  [address] [facility] [id number], [place] [address]  Phone: [phone]  Fax: (   )    -  Follow Up Time: 1 week

## 2019-12-30 NOTE — PROGRESS NOTE ADULT - PROBLEM SELECTOR PLAN 6
- patient with elevated CK to 2425, UA with moderate flood, few RBC most likely 2/2 to immobility  - continue hydration with IVF  - trend CK - patient with ROLF with Cr 1.42 now 1.16 most likely 2/2 to infection vs rhabdo  - continue fluids, abx for UTI  - monitor Cr

## 2019-12-30 NOTE — PROGRESS NOTE ADULT - PROBLEM SELECTOR PLAN 9
- patient on metformin at home  - FS, ISS premeal, qhs  - f/u A1c    HTN  - on labetalol at home, will need to verify dosage  - continue to monitor in the setting of sepsis     Medication management  - complete med rec - patient on metformin and glimepiride at home  - FS, ISS premeal, qhs  - A1c 7.1  HTN  -On labetalol at home  -Trend Bp's

## 2019-12-30 NOTE — DISCHARGE NOTE PROVIDER - NSDCACTIVITY_GEN_ALL_CORE
Stairs allowed/No restrictions/Showering allowed/Driving allowed/Walking - Indoors allowed/Sex allowed/Return to Work/School allowed/Bathing allowed/Walking - Outdoors allowed

## 2019-12-30 NOTE — PROGRESS NOTE ADULT - PROBLEM SELECTOR PLAN 4
- s/p vanc and zosyn, will continue ceftriaxone   - f/u urine culture - s/p vanc and zosyn, will continue ceftriaxone   - urine culture: NGTD - complicated UTI - continue ceftriaxone   - urine culture: NGTD

## 2019-12-30 NOTE — DISCHARGE NOTE PROVIDER - NSDCMRMEDTOKEN_GEN_ALL_CORE_FT
ALPRAZolam 2 mg oral tablet, extended release: 2 tab(s) orally once a day (in the morning)  glimepiride 4 mg oral tablet: 1 tab(s) orally once a day  labetalol 200 mg oral tablet: 1 tab(s) orally 2 times a day  metFORMIN 1000 mg oral tablet: 1 tab(s) orally 2 times a day ALPRAZolam 2 mg oral tablet, extended release: 2 tab(s) orally once a day (in the morning)  cefuroxime 250 mg oral tablet: 1 tab(s) orally 2 times a day   glimepiride 4 mg oral tablet: 1 tab(s) orally once a day  metFORMIN 1000 mg oral tablet: 1 tab(s) orally 2 times a day  oseltamivir 30 mg oral capsule: 1 cap(s) orally 2 times a day ALPRAZolam 2 mg oral tablet, extended release: 2 tab(s) orally once a day (in the morning)  cefuroxime 250 mg oral tablet: 1 tab(s) orally 2 times a day   glimepiride 4 mg oral tablet: 1 tab(s) orally once a day  labetalol 200 mg oral tablet: 1 tab(s) orally 2 times a day  metFORMIN 1000 mg oral tablet: 1 tab(s) orally 2 times a day  oseltamivir 30 mg oral capsule: 1 cap(s) orally 2 times a day

## 2019-12-30 NOTE — DISCHARGE NOTE PROVIDER - NSDCCPCAREPLAN_GEN_ALL_CORE_FT
PRINCIPAL DISCHARGE DIAGNOSIS  Diagnosis: Influenza  Assessment and Plan of Treatment: You were admitted to the hospital and diagnosed with the Flu and an urinary tract infection. You were given a 3 day course of antibiotics. A CT scan of your brain was completed which was unremarkable. You should schedule a follow up appointment with your PCP within one week of discharge.      SECONDARY DISCHARGE DIAGNOSES  Diagnosis: UTI (urinary tract infection)  Assessment and Plan of Treatment: PRINCIPAL DISCHARGE DIAGNOSIS  Diagnosis: Influenza  Assessment and Plan of Treatment: You were admitted to the hospital and diagnosed with the Flu and an urinary tract infection. You were given a 3 day course of antibiotics for the UTI and given Tamiflu to help fight the flu.  A CT scan of your brain was completed which was unremarkable. You should schedule a follow up appointment with your PCP within one week of discharge.      SECONDARY DISCHARGE DIAGNOSES  Diagnosis: UTI (urinary tract infection)  Assessment and Plan of Treatment: PRINCIPAL DISCHARGE DIAGNOSIS  Diagnosis: Influenza  Assessment and Plan of Treatment: You were admitted to the hospital and diagnosed with the Flu and an urinary tract infection. You were given a 3 day course of antibiotics for the UTI and given Tamiflu to help fight the flu.  A CT scan of your brain was completed which was unremarkable.   -Please continue the antibiotics for 4 more days and continue the tamiflu for 3 more days.  -If you have worsening of your symptoms, please return to the ED.   -You should schedule a follow up appointment with your PCP within one week of discharge. Dr. ISLAS would like to see you on Monday.      SECONDARY DISCHARGE DIAGNOSES  Diagnosis: UTI (urinary tract infection)  Assessment and Plan of Treatment:

## 2019-12-30 NOTE — PROGRESS NOTE ADULT - PROBLEM SELECTOR PLAN 5
- patient with ROLF with Cr 1.42 most likely 2/2 to infection vs rhabdo  - continue fluids, abx for UTI  - monitor Cr - patient with elevated CK to 2425 given 2L Bolus in ED and then started on 100ml/hr drip. Repeat CK 3183. Given another 1L Bolus  - continue hydration with IVF  - trend CK

## 2019-12-30 NOTE — PROGRESS NOTE ADULT - PROBLEM SELECTOR PLAN 1
- patient presented with altered mental status most likely 2/2 to infection. Mental status resolved, s/p fluids, abx, now back at baseline  - No acute findings on CTH. No significant electrolyte imbalances  - UA positive for UTI, RVP positive for influenza  - continue fluids, tamiflu, abx for UTI - patient presented with altered mental status most likely 2/2 to infection. Mental status resolved  -s/p fluids, abx, now back at baseline  - No acute findings on CTH. No significant electrolyte imbalances  - UA positive for UTI, RVP positive for influenza  - continue fluids, tamiflu, abx for UTI

## 2019-12-30 NOTE — PROGRESS NOTE ADULT - PROBLEM SELECTOR PLAN 8
- elevated T.bili to 1.7, AST elevated to 69, most likely 2/2 sepsis  - no concern for intraabdominal infection  - continue IVF and trend - elevated T.bili to 1.7 now .9 most likely 2/2 sepsis  - no concern for intraabdominal infection  - continue IVF and trend

## 2019-12-30 NOTE — PROGRESS NOTE ADULT - ASSESSMENT
69 M with PMH of DM presented with altered mental status found to be septic on admission 2/2 to Flu and UTI. Also with elevated CK 2/2 to rhabdo, complicated by ROLF. 69 M with PMH of DM presented with altered mental status 2/2 sepsis from the Flu and UTI. Also with elevated CK 2/2 to rhabdomyolysis

## 2019-12-30 NOTE — DISCHARGE NOTE PROVIDER - NSDCFUADDAPPT_GEN_ALL_CORE_FT
-Dr. Pena would like to follow up with you on Monday, January 6th, 2020. You may walk in for an appointment or make an appointment.

## 2019-12-30 NOTE — DISCHARGE NOTE PROVIDER - PROVIDER TOKENS
FREE:[LAST:[Jean],FIRST:[Quirino],PHONE:[phone]],FAX:[phone]   [phone]    -],ADDRESS:[[address] [place] [address] [address], [place] [address]],FOLLOWUP:[1 week]]

## 2019-12-30 NOTE — PROGRESS NOTE ADULT - PROBLEM SELECTOR PLAN 2
- patient febrile, tachycardic on admission 2/2 to influenza and UTI. No leukocytosis, however 6.9% bands. Elevated lactate of 3.4 on admission, downtrended to 1.6  - UCx, blood cultures pending  - continue treatment for UTI and Flu  - continue fluids - patient febrile, tachycardic on admission 2/2 to influenza and UTI. No leukocytosis, however 6.9% bands.  -Elevated lactate of 3.4 on admission, downtrended to 1.6  - UCx, blood cultures pending  - continue fluids and abx

## 2019-12-30 NOTE — PROGRESS NOTE ADULT - SUBJECTIVE AND OBJECTIVE BOX
Aric Dejesus DDS [facility] [place] PGY1  Pager #[phone]    INCOMPLETE NOTE - IN PROGRESS    Patient is a 69y old  Male who presents with a chief complaint of altered mental status, sepsis (29 Dec 2019 22:05)      SUBJECTIVE/INTERVAL EVENTS: Patient seen and examined at bedside.    MEDICATIONS  (STANDING):  cefTRIAXone   IVPB 1000 milliGRAM(s) IV Intermittent every 24 hours  dextrose 5%. 1000 milliLiter(s) (50 mL/Hr) IV Continuous <Continuous>  dextrose 50% Injectable 12.5 Gram(s) IV Push once  dextrose 50% Injectable 25 Gram(s) IV Push once  dextrose 50% Injectable 25 Gram(s) IV Push once  heparin  Injectable 5000 Unit(s) SubCutaneous every 8 hours  insulin lispro (HumaLOG) corrective regimen sliding scale   SubCutaneous three times a day before meals  insulin lispro (HumaLOG) corrective regimen sliding scale   SubCutaneous at bedtime  oseltamivir 30 milliGRAM(s) Oral two times a day  potassium phosphate IVPB 15 milliMole(s) IV Intermittent once  sodium chloride 0.9%. 1000 milliLiter(s) (100 mL/Hr) IV Continuous <Continuous>    MEDICATIONS  (PRN):  dextrose 40% Gel 15 Gram(s) Oral once PRN Blood Glucose LESS THAN 70 milliGRAM(s)/deciliter  glucagon  Injectable 1 milliGRAM(s) IntraMuscular once PRN Glucose LESS THAN 70 milligrams/deciliter      VITAL SIGNS:  T(F): 99.3 (19 @ 06:24), Max: 104 (19 @ 15:25)  HR: 97 (19 @ 06:24) (94 - 128)  BP: 137/77 (19 @ 06:24) (126/65 - 172/82)  RR: 15 (19 @ 06:24) (15 - 18)  SpO2: 97% (19 @ 06:24) (96% - 100%)    I&O's Summary    Daily Height in cm: 177.8 (29 Dec 2019 22:39)    Daily     PHYSICAL EXAM:  Gen: Alert, well-developed, NAD  HEENT: NCAT, PERRL, EOMI, conjunctiva clear, sclera anicteric, no erythema or exudates in the oropharynx, mmm  Neck: Supple, no JVD  CV: RRR, S1S2, no m/r/g  Resp: CTAB, normal respiratory effort  Abd: Soft, nontender, nondistended, normal bowel sounds  Ext: + peripheral pulses, no edema, no clubbing or cyanosis  Neuro: AOx3, CN2-12 grossly intact  SKIN: No rashes, ecchymoses    LABS:                        12.6   8.84  )-----------( 180      ( 30 Dec 2019 05:28 )             35.6     Hgb Trend: 12.6<--, 14.4<--  12    135  |  103  |  21  ----------------------------<  246<H>  3.9   |  23  |  1.16    Ca    8.8      30 Dec 2019 05:28  Phos  1.5       Mg     1.6         TPro  6.3  /  Alb  3.2<L>  /  TBili  0.9  /  DBili  x   /  AST  89<H>  /  ALT  34  /  AlkPhos  48      Creatinine Trend: 1.16<--, 1.41<--  LIVER FUNCTIONS - ( 30 Dec 2019 05:28 )  Alb: 3.2 g/dL / Pro: 6.3 g/dL / ALK PHOS: 48 u/L / ALT: 34 u/L / AST: 89 u/L / GGT: x           PT/INR - ( 29 Dec 2019 14:50 )   PT: 13.2 SEC;   INR: 1.18          PTT - ( 29 Dec 2019 14:50 )  PTT:29.1 SEC  CARDIAC MARKERS ( 29 Dec 2019 14:54 )  x     / x     / 2425 u/L / 3.63 ng/mL / x          Urinalysis Basic - ( 29 Dec 2019 18:27 )    Color: LIGHT YELLOW / Appearance: CLEAR / S.025 / pH: 6.0  Gluc: >1000 / Ketone: SMALL  / Bili: NEGATIVE / Urobili: NORMAL   Blood: MODERATE / Protein: 200 / Nitrite: POSITIVE   Leuk Esterase: NEGATIVE / RBC: 3-5 / WBC 6-10   Sq Epi: OCC / Non Sq Epi: x / Bacteria: NEGATIVE        CAPILLARY BLOOD GLUCOSE      POCT Blood Glucose.: 257 mg/dL (29 Dec 2019 23:44)  POCT Blood Glucose.: 343 mg/dL (29 Dec 2019 20:25)  POCT Blood Glucose.: 342 mg/dL (29 Dec 2019 14:22)      RADIOLOGY & ADDITIONAL TESTS: Reviewed    Imaging Personally Reviewed:    Consultant(s) Notes Reviewed:      Care Discussed with Consultants/Other Providers: Aric Dejesus DDS [facility] [place] PGY1  Pager #[phone]    Patient is a 69y old  Male who presents with a chief complaint of altered mental status, sepsis (29 Dec 2019 22:05)    SUBJECTIVE/INTERVAL EVENTS: Patient seen and examined at bedside. No acute events overnight. Denies nausea, vomiting, constipation, diarrhea, fevers, chills, chest pain, SOB. Pt states he is feeling better than yesterday.       MEDICATIONS  (STANDING):  cefTRIAXone   IVPB 1000 milliGRAM(s) IV Intermittent every 24 hours  dextrose 5%. 1000 milliLiter(s) (50 mL/Hr) IV Continuous <Continuous>  dextrose 50% Injectable 12.5 Gram(s) IV Push once  dextrose 50% Injectable 25 Gram(s) IV Push once  dextrose 50% Injectable 25 Gram(s) IV Push once  heparin  Injectable 5000 Unit(s) SubCutaneous every 8 hours  insulin lispro (HumaLOG) corrective regimen sliding scale   SubCutaneous three times a day before meals  insulin lispro (HumaLOG) corrective regimen sliding scale   SubCutaneous at bedtime  oseltamivir 30 milliGRAM(s) Oral two times a day  potassium phosphate IVPB 15 milliMole(s) IV Intermittent once  sodium chloride 0.9%. 1000 milliLiter(s) (100 mL/Hr) IV Continuous <Continuous>    MEDICATIONS  (PRN):  dextrose 40% Gel 15 Gram(s) Oral once PRN Blood Glucose LESS THAN 70 milliGRAM(s)/deciliter  glucagon  Injectable 1 milliGRAM(s) IntraMuscular once PRN Glucose LESS THAN 70 milligrams/deciliter      VITAL SIGNS:  T(F): 99.3 (19 @ 06:24), Max: 104 (19 @ 15:25)  HR: 97 (19 @ 06:24) (94 - 128)  BP: 137/77 (19 @ 06:24) (126/65 - 172/82)  RR: 15 (19 @ 06:24) (15 - 18)  SpO2: 97% (19 @ 06:24) (96% - 100%)    I&O's Summary    Daily Height in cm: 177.8 (29 Dec 2019 22:39)    Daily     PHYSICAL EXAM:  Gen: Alert, well-developed, NAD  HEENT: NCAT, PERRL, EOMI, conjunctiva clear, sclera anicteric, no erythema or exudates in the oropharynx, mmm  Neck: Supple, no JVD  CV: RRR, S1S2, no m/r/g  Resp: CTAB, normal respiratory effort  Abd: Soft, nontender, nondistended, normal bowel sounds  Ext: + peripheral pulses, no edema, no clubbing or cyanosis  Neuro: AOx3, CN2-12 grossly intact  SKIN: No rashes, ecchymoses    LABS:                        12.6   8.84  )-----------( 180      ( 30 Dec 2019 05:28 )             35.6     Hgb Trend: 12.6<--, 14.4<--  12-30    135  |  103  |  21  ----------------------------<  246<H>  3.9   |  23  |  1.16    Ca    8.8      30 Dec 2019 05:28  Phos  1.5     12-30  Mg     1.6     12-30    TPro  6.3  /  Alb  3.2<L>  /  TBili  0.9  /  DBili  x   /  AST  89<H>  /  ALT  34  /  AlkPhos  48  12-30    Creatinine Trend: 1.16<--, 1.41<--  LIVER FUNCTIONS - ( 30 Dec 2019 05:28 )  Alb: 3.2 g/dL / Pro: 6.3 g/dL / ALK PHOS: 48 u/L / ALT: 34 u/L / AST: 89 u/L / GGT: x           PT/INR - ( 29 Dec 2019 14:50 )   PT: 13.2 SEC;   INR: 1.18          PTT - ( 29 Dec 2019 14:50 )  PTT:29.1 SEC  CARDIAC MARKERS ( 29 Dec 2019 14:54 )  x     / x     / 2425 u/L / 3.63 ng/mL / x          Urinalysis Basic - ( 29 Dec 2019 18:27 )    Color: LIGHT YELLOW / Appearance: CLEAR / S.025 / pH: 6.0  Gluc: >1000 / Ketone: SMALL  / Bili: NEGATIVE / Urobili: NORMAL   Blood: MODERATE / Protein: 200 / Nitrite: POSITIVE   Leuk Esterase: NEGATIVE / RBC: 3-5 / WBC 6-10   Sq Epi: OCC / Non Sq Epi: x / Bacteria: NEGATIVE        CAPILLARY BLOOD GLUCOSE      POCT Blood Glucose.: 257 mg/dL (29 Dec 2019 23:44)  POCT Blood Glucose.: 343 mg/dL (29 Dec 2019 20:25)  POCT Blood Glucose.: 342 mg/dL (29 Dec 2019 14:22)      RADIOLOGY & ADDITIONAL TESTS: Reviewed  < from: CT Head No Cont (19 @ 19:40) >  EXAM:  CT BRAIN        PROCEDURE DATE:  Dec 29 2019   INTERPRETATION:  CLINICAL INFORMATION: Altered mental status.    TECHNIQUE: Noncontrast CT scan with serial axial images through the head was performed. Sagittal and coronal computer-generated reconstructed views were obtained.    COMPARISON STUDY: None available.    FINDINGS:     Beam hardening artifact slightly obscures evaluation of the posterior fossa and brainstem.    There is no acute intra-axial or extra-axial hemorrhage. There is no mass effect or shift of the midline. The basal cisterns are not effaced.     There is  mild cerebral volume loss with slight prominence of the ventricles and sulci. There are mild patchy areas of low attenuation within the periventricular and subcortical white matter which are nonspecific but likely the sequela of chronic microvascular change. There is no CT evidence of a large vascular territory infarct.    Polyp versus mucosal retention cyst in the right maxillary sinus. Remaining visualized paranasal sinuses and mastoid air cells are well aerated.    No significant scalp soft tissue swelling. No acute calvarium fracture.     IMPRESSION:    No acute intracranial hemorrhage, mass effect, or CT evidence of a large vascular territory infarct.    If there are new or persistent symptoms, consider further evaluation with MRI provided no contraindications.    < end of copied text >    Imaging Personally Reviewed:    Consultant(s) Notes Reviewed:      Care Discussed with Consultants/Other Providers: Aric Dejesus DDS [place] PGY1  Pager #[phone]    Patient is a 69y old  Male who presents with a chief complaint of altered mental status, sepsis (29 Dec 2019 22:05)    SUBJECTIVE/INTERVAL EVENTS: Patient seen and examined at bedside. No acute events overnight. Denies nausea, vomiting, constipation, diarrhea, fevers, chills, chest pain, SOB. Pt states he is feeling better than yesterday.       MEDICATIONS  (STANDING):  cefTRIAXone   IVPB 1000 milliGRAM(s) IV Intermittent every 24 hours  dextrose 5%. 1000 milliLiter(s) (50 mL/Hr) IV Continuous <Continuous>  dextrose 50% Injectable 12.5 Gram(s) IV Push once  dextrose 50% Injectable 25 Gram(s) IV Push once  dextrose 50% Injectable 25 Gram(s) IV Push once  heparin  Injectable 5000 Unit(s) SubCutaneous every 8 hours  insulin lispro (HumaLOG) corrective regimen sliding scale   SubCutaneous three times a day before meals  insulin lispro (HumaLOG) corrective regimen sliding scale   SubCutaneous at bedtime  oseltamivir 30 milliGRAM(s) Oral two times a day  potassium phosphate IVPB 15 milliMole(s) IV Intermittent once  sodium chloride 0.9%. 1000 milliLiter(s) (100 mL/Hr) IV Continuous <Continuous>    MEDICATIONS  (PRN):  dextrose 40% Gel 15 Gram(s) Oral once PRN Blood Glucose LESS THAN 70 milliGRAM(s)/deciliter  glucagon  Injectable 1 milliGRAM(s) IntraMuscular once PRN Glucose LESS THAN 70 milligrams/deciliter      VITAL SIGNS:  T(F): 99.3 (19 @ 06:24), Max: 104 (19 @ 15:25)  HR: 97 (19 @ 06:24) (94 - 128)  BP: 137/77 (19 @ 06:24) (126/65 - 172/82)  RR: 15 (19 @ 06:24) (15 - 18)  SpO2: 97% (19 @ 06:24) (96% - 100%)    I&O's Summary    Daily Height in cm: 177.8 (29 Dec 2019 22:39)    Daily     PHYSICAL EXAM:  Gen: Alert, well-developed, NAD  HEENT: NCAT, PERRL, EOMI, conjunctiva clear, sclera anicteric, no erythema or exudates in the oropharynx, mmm  Neck: Supple, no JVD  CV: RRR, S1S2, no m/r/g  Resp: CTAB, normal respiratory effort  Abd: Soft, nontender, nondistended, normal bowel sounds  Ext: + peripheral pulses, no edema, no clubbing or cyanosis  Neuro: AOx3, CN2-12 grossly intact  SKIN: No rashes, ecchymoses    LABS:                        12.6   8.84  )-----------( 180      ( 30 Dec 2019 05:28 )             35.6     Hgb Trend: 12.6<--, 14.4<--  12-30    135  |  103  |  21  ----------------------------<  246<H>  3.9   |  23  |  1.16    Ca    8.8      30 Dec 2019 05:28  Phos  1.5     12-30  Mg     1.6     12-30    TPro  6.3  /  Alb  3.2<L>  /  TBili  0.9  /  DBili  x   /  AST  89<H>  /  ALT  34  /  AlkPhos  48  12-30    Creatinine Trend: 1.16<--, 1.41<--  LIVER FUNCTIONS - ( 30 Dec 2019 05:28 )  Alb: 3.2 g/dL / Pro: 6.3 g/dL / ALK PHOS: 48 u/L / ALT: 34 u/L / AST: 89 u/L / GGT: x           PT/INR - ( 29 Dec 2019 14:50 )   PT: 13.2 SEC;   INR: 1.18          PTT - ( 29 Dec 2019 14:50 )  PTT:29.1 SEC  CARDIAC MARKERS ( 29 Dec 2019 14:54 )  x     / x     / 2425 u/L / 3.63 ng/mL / x          Urinalysis Basic - ( 29 Dec 2019 18:27 )    Color: LIGHT YELLOW / Appearance: CLEAR / S.025 / pH: 6.0  Gluc: >1000 / Ketone: SMALL  / Bili: NEGATIVE / Urobili: NORMAL   Blood: MODERATE / Protein: 200 / Nitrite: POSITIVE   Leuk Esterase: NEGATIVE / RBC: 3-5 / WBC 6-10   Sq Epi: OCC / Non Sq Epi: x / Bacteria: NEGATIVE        CAPILLARY BLOOD GLUCOSE      POCT Blood Glucose.: 257 mg/dL (29 Dec 2019 23:44)  POCT Blood Glucose.: 343 mg/dL (29 Dec 2019 20:25)  POCT Blood Glucose.: 342 mg/dL (29 Dec 2019 14:22)      RADIOLOGY & ADDITIONAL TESTS: Reviewed  < from: CT Head No Cont (19 @ 19:40) >  EXAM:  CT BRAIN        PROCEDURE DATE:  Dec 29 2019   INTERPRETATION:  CLINICAL INFORMATION: Altered mental status.    TECHNIQUE: Noncontrast CT scan with serial axial images through the head was performed. Sagittal and coronal computer-generated reconstructed views were obtained.    COMPARISON STUDY: None available.    FINDINGS:     Beam hardening artifact slightly obscures evaluation of the posterior fossa and brainstem.    There is no acute intra-axial or extra-axial hemorrhage. There is no mass effect or shift of the midline. The basal cisterns are not effaced.     There is  mild cerebral volume loss with slight prominence of the ventricles and sulci. There are mild patchy areas of low attenuation within the periventricular and subcortical white matter which are nonspecific but likely the sequela of chronic microvascular change. There is no CT evidence of a large vascular territory infarct.    Polyp versus mucosal retention cyst in the right maxillary sinus. Remaining visualized paranasal sinuses and mastoid air cells are well aerated.    No significant scalp soft tissue swelling. No acute calvarium fracture.     IMPRESSION:    No acute intracranial hemorrhage, mass effect, or CT evidence of a large vascular territory infarct.    If there are new or persistent symptoms, consider further evaluation with MRI provided no contraindications.    Imaging Personally Reviewed:    Consultant(s) Notes Reviewed:      Care Discussed with Consultants/Other Providers: Aric Dejesus DDS [facility] [place] PGY1  Pager #[phone]    Patient is a 69y old  Male who presents with a chief complaint of altered mental status, sepsis (29 Dec 2019 22:05)    SUBJECTIVE/INTERVAL EVENTS: Patient seen and examined at bedside. No acute events overnight. Denies nausea, vomiting, constipation, diarrhea, fevers, chills, chest pain, SOB. Pt states he is feeling better than yesterday.  Family by bedside.      MEDICATIONS  (STANDING):  cefTRIAXone   IVPB 1000 milliGRAM(s) IV Intermittent every 24 hours  dextrose 5%. 1000 milliLiter(s) (50 mL/Hr) IV Continuous <Continuous>  dextrose 50% Injectable 12.5 Gram(s) IV Push once  dextrose 50% Injectable 25 Gram(s) IV Push once  dextrose 50% Injectable 25 Gram(s) IV Push once  heparin  Injectable 5000 Unit(s) SubCutaneous every 8 hours  insulin lispro (HumaLOG) corrective regimen sliding scale   SubCutaneous three times a day before meals  insulin lispro (HumaLOG) corrective regimen sliding scale   SubCutaneous at bedtime  oseltamivir 30 milliGRAM(s) Oral two times a day  potassium phosphate IVPB 15 milliMole(s) IV Intermittent once  sodium chloride 0.9%. 1000 milliLiter(s) (100 mL/Hr) IV Continuous <Continuous>    MEDICATIONS  (PRN):  dextrose 40% Gel 15 Gram(s) Oral once PRN Blood Glucose LESS THAN 70 milliGRAM(s)/deciliter  glucagon  Injectable 1 milliGRAM(s) IntraMuscular once PRN Glucose LESS THAN 70 milligrams/deciliter      VITAL SIGNS:  T(F): 99.3 (19 @ 06:24), Max: 104 (19 @ 15:25)  HR: 97 (19 @ 06:24) (94 - 128)  BP: 137/77 (19 @ 06:24) (126/65 - 172/82)  RR: 15 (19 @ 06:24) (15 - 18)  SpO2: 97% (19 @ 06:24) (96% - 100%)    I&O's Summary    Daily Height in cm: 177.8 (29 Dec 2019 22:39)    Daily     PHYSICAL EXAM:  Gen: Alert, well-developed, NAD  HEENT: NCAT, PERRL, EOMI, conjunctiva clear, sclera anicteric, no erythema or exudates in the oropharynx, mmm  Neck: Supple, no JVD  CV: RRR, S1S2, no m/r/g  Resp: CTAB, normal respiratory effort  Abd: Soft, nontender, nondistended, normal bowel sounds  Ext: + peripheral pulses, no edema, no clubbing or cyanosis  Neuro: AOx3, CN2-12 grossly intact  SKIN: No rashes, ecchymoses    LABS:                        12.6   8.84  )-----------( 180      ( 30 Dec 2019 05:28 )             35.6     Hgb Trend: 12.6<--, 14.4<--  1230    135  |  103  |  21  ----------------------------<  246<H>  3.9   |  23  |  1.16    Ca    8.8      30 Dec 2019 05:28  Phos  1.5     12-30  Mg     1.6     1230    TPro  6.3  /  Alb  3.2<L>  /  TBili  0.9  /  DBili  x   /  AST  89<H>  /  ALT  34  /  AlkPhos  48  12-30    Creatinine Trend: 1.16<--, 1.41<--  LIVER FUNCTIONS - ( 30 Dec 2019 05:28 )  Alb: 3.2 g/dL / Pro: 6.3 g/dL / ALK PHOS: 48 u/L / ALT: 34 u/L / AST: 89 u/L / GGT: x           PT/INR - ( 29 Dec 2019 14:50 )   PT: 13.2 SEC;   INR: 1.18          PTT - ( 29 Dec 2019 14:50 )  PTT:29.1 SEC  CARDIAC MARKERS ( 29 Dec 2019 14:54 )  x     / x     / 2425 u/L / 3.63 ng/mL / x          Urinalysis Basic - ( 29 Dec 2019 18:27 )    Color: LIGHT YELLOW / Appearance: CLEAR / S.025 / pH: 6.0  Gluc: >1000 / Ketone: SMALL  / Bili: NEGATIVE / Urobili: NORMAL   Blood: MODERATE / Protein: 200 / Nitrite: POSITIVE   Leuk Esterase: NEGATIVE / RBC: 3-5 / WBC 6-10   Sq Epi: OCC / Non Sq Epi: x / Bacteria: NEGATIVE        CAPILLARY BLOOD GLUCOSE      POCT Blood Glucose.: 257 mg/dL (29 Dec 2019 23:44)  POCT Blood Glucose.: 343 mg/dL (29 Dec 2019 20:25)  POCT Blood Glucose.: 342 mg/dL (29 Dec 2019 14:22)      RADIOLOGY & ADDITIONAL TESTS: Reviewed  < from: CT Head No Cont (19 @ 19:40) >  EXAM:  CT BRAIN        PROCEDURE DATE:  Dec 29 2019   INTERPRETATION:  CLINICAL INFORMATION: Altered mental status.    TECHNIQUE: Noncontrast CT scan with serial axial images through the head was performed. Sagittal and coronal computer-generated reconstructed views were obtained.    COMPARISON STUDY: None available.    FINDINGS:     Beam hardening artifact slightly obscures evaluation of the posterior fossa and brainstem.    There is no acute intra-axial or extra-axial hemorrhage. There is no mass effect or shift of the midline. The basal cisterns are not effaced.     There is  mild cerebral volume loss with slight prominence of the ventricles and sulci. There are mild patchy areas of low attenuation within the periventricular and subcortical white matter which are nonspecific but likely the sequela of chronic microvascular change. There is no CT evidence of a large vascular territory infarct.    Polyp versus mucosal retention cyst in the right maxillary sinus. Remaining visualized paranasal sinuses and mastoid air cells are well aerated.    No significant scalp soft tissue swelling. No acute calvarium fracture.     IMPRESSION:    No acute intracranial hemorrhage, mass effect, or CT evidence of a large vascular territory infarct.    If there are new or persistent symptoms, consider further evaluation with MRI provided no contraindications.    Imaging Personally Reviewed:    Consultant(s) Notes Reviewed:      Care Discussed with Consultants/Other Providers:

## 2019-12-30 NOTE — PROGRESS NOTE ADULT - PROBLEM SELECTOR PLAN 10
- DVT ppx: HSQ  - Diet: DASH/CC  - Dispo: pending    Transitions of Care Status:  1.  Name of PCP: Dr. Quirino Pena  2.  PCP Contacted on Admission: [ ] Y    [x ] N    3.  PCP contacted at Discharge: [ ] Y    [ ] N    [ ] N/A  4.  Post-Discharge Appointment Date and Location:  5.  Summary of Handoff given to PCP: - DVT ppx: HSQ  - Diet: DASH/CC  -PT Consult: Pending  Transitions of Care Status:  1.  Name of PCP: Dr. Quirino Pena. [phone]  2.  PCP Contacted on Admission: [ X] Y    [ ] N  -Left message with .  3.  PCP contacted at Discharge: [ ] Y    [ ] N    [ ] N/A  4.  Post-Discharge Appointment Date and Location:  5.  Summary of Handoff given to PCP: - DVT ppx: HSQ  - Diet: DASH/CC  -PT Consult: Pending  Transitions of Care Status:  1.  Name of PCP: Dr. Quirino Pena. [phone]  2.  PCP Contacted on Admission: [ ] Y    [ ] N   3.  PCP contacted at Discharge: [ ] Y    [ ] N    [ ] N/A  4.  Post-Discharge Appointment Date and Location:  5.  Summary of Handoff given to PCP:

## 2019-12-30 NOTE — PROVIDER CONTACT NOTE (OTHER) - ASSESSMENT
Antibiotics and tamilfu maintained. IVF maintained. , /92, temp 98.1 oxygen 98% on RA. labetalol given as per order. As per MD, will reassess in an hour. Antibiotics and tamilfu maintained. IVF maintained. , /92, temp 98.1 oxygen 98% on RA. Pt denies any pain or discomfort. Pt is anxious about going home. Labetalol given as per order. As per MD, will reassess in an hour.

## 2019-12-30 NOTE — DISCHARGE NOTE PROVIDER - HOSPITAL COURSE
HPI:    69 M with PMH of DM presented with altered mental status for one day. Per daughter, patient was not responding to phone calls or messages so she went to visit him and found him confused by the side of the bed. He had defecated and urinated on himself. She had to help him up because he was so weak. He was disoriented and having slow speech at the time. Patient denies any falls but can't remember what had happened. He denies any lightheadedness or dizziness. No chest pain or shortness of breath. Patient has had a productive cough for past few days. No fevers at home, no sick contacts. Patient denies any abdominal pain, dysuria, hematuria, or diarrhea. Patient was doing well last week. No history of prior hospitalizations.         In the ED, patient was febrile to 104, , /71, RR 18 satting 100% on room air. Patient received 2L NS, vanc, zosyn, tylenol, and ibuprofen. (29 Dec 2019 22:05)        Pt admitted to floor and returned to baseline mental status. CT head completed with no remarkable findings. Troponin elevated to 79. Repeat test was also 79, CKMB was normal. Lactate upon arrival was 3.4 and downtrended to 1.6. Creatine was 1.41 and continued to downtrend during the hospital stay. Creatine Kinase of 2425 at arrival, received 2L bolus. Repeat CK was 3100 and received another 1L bolus and given 1L 100cc/hr. Pt was diagnosed with a UTI and Flu Type A. He was started on a 3 day course of ceftriaxone. Blood cultures........ Urine cultures....... Physical therapy was consulted ............                    EXAM:  CT BRAIN      PROCEDURE DATE:  Dec 29 2019    INTERPRETATION:  CLINICAL INFORMATION: Altered mental status.        TECHNIQUE: Noncontrast CT scan with serial axial images through the head was performed. Sagittal and coronal computer-generated reconstructed views were obtained.        COMPARISON STUDY: None available.        FINDINGS:         Beam hardening artifact slightly obscures evaluation of the posterior fossa and brainstem.        There is no acute intra-axial or extra-axial hemorrhage. There is no mass effect or shift of the midline. The basal cisterns are not effaced.         There is  mild cerebral volume loss with slight prominence of the ventricles and sulci. There are mild patchy areas of low attenuation within the periventricular and subcortical white matter which are nonspecific but likely the sequela of chronic microvascular change. There is no CT evidence of a large vascular territory infarct.        Polyp versus mucosal retention cyst in the right maxillary sinus. Remaining visualized paranasal sinuses and mastoid air cells are well aerated.        No significant scalp soft tissue swelling. No acute calvarium fracture.         IMPRESSION:        No acute intracranial hemorrhage, mass effect, or CT evidence of a large vascular territory infarct.        If there are new or persistent symptoms, consider further evaluation with MRI provided no contraindications. HPI:    69 M with PMH of DM presented with altered mental status for one day. Per daughter, patient was not responding to phone calls or messages so she went to visit him and found him confused by the side of the bed. He had defecated and urinated on himself. She had to help him up because he was so weak. He was disoriented and having slow speech at the time. Patient denies any falls but can't remember what had happened. He denies any lightheadedness or dizziness. No chest pain or shortness of breath. Patient has had a productive cough for past few days. No fevers at home, no sick contacts. Patient denies any abdominal pain, dysuria, hematuria, or diarrhea. Patient was doing well last week. No history of prior hospitalizations.         In the ED, patient was febrile to 104, , /71, RR 18 satting 100% on room air. Patient received 2L NS, vanc, zosyn, tylenol, and ibuprofen. (29 Dec 2019 22:05)        Pt admitted to floor and returned to baseline mental status. CT head completed with no remarkable findings. Troponin elevated to 79. Repeat test was also 79, CKMB was normal. Lactate upon arrival was 3.4 and downtrended to 1.6. Creatine was 1.41 and continued to downtrend during the hospital stay. Creatine Kinase of 2425 at arrival, received 2L bolus. Repeat CK was 3100 and received another 1L bolus and given 1L 100cc/hr. Pt was diagnosed with a UTI and Flu Type A. He was started on a 3 day course of ceftriaxone. Blood cultures did not grow anything. Urine cultures also did not grow anything. Physical therapy was consulted and recommended home with home PT. He will be discharged on 4 more days of ceftin and 3 more days of tamiflu.         EXAM:  CT BRAIN      PROCEDURE DATE:  Dec 29 2019    INTERPRETATION:  CLINICAL INFORMATION: Altered mental status.    TECHNIQUE: Noncontrast CT scan with serial axial images through the head was performed. Sagittal and coronal computer-generated reconstructed views were obtained.    COMPARISON STUDY: None available.    FINDINGS:     Beam hardening artifact slightly obscures evaluation of the posterior fossa and brainstem.    There is no acute intra-axial or extra-axial hemorrhage. There is no mass effect or shift of the midline. The basal cisterns are not effaced.     There is  mild cerebral volume loss with slight prominence of the ventricles and sulci. There are mild patchy areas of low attenuation within the periventricular and subcortical white matter which are nonspecific but likely the sequela of chronic microvascular change. There is no CT evidence of a large vascular territory infarct.    Polyp versus mucosal retention cyst in the right maxillary sinus. Remaining visualized paranasal sinuses and mastoid air cells are well aerated.    No significant scalp soft tissue swelling. No acute calvarium fracture.     IMPRESSION:    No acute intracranial hemorrhage, mass effect, or CT evidence of a large vascular territory infarct.    If there are new or persistent symptoms, consider further evaluation with MRI provided no contraindications.

## 2019-12-31 VITALS
RESPIRATION RATE: 17 BRPM | OXYGEN SATURATION: 100 % | TEMPERATURE: 99 F | SYSTOLIC BLOOD PRESSURE: 166 MMHG | HEART RATE: 100 BPM | DIASTOLIC BLOOD PRESSURE: 90 MMHG

## 2019-12-31 LAB
ALBUMIN SERPL ELPH-MCNC: 3.4 G/DL — SIGNIFICANT CHANGE UP (ref 3.3–5)
ALP SERPL-CCNC: 50 U/L — SIGNIFICANT CHANGE UP (ref 40–120)
ALT FLD-CCNC: 38 U/L — SIGNIFICANT CHANGE UP (ref 4–41)
ANION GAP SERPL CALC-SCNC: 12 MMO/L — SIGNIFICANT CHANGE UP (ref 7–14)
AST SERPL-CCNC: 85 U/L — HIGH (ref 4–40)
BACTERIA UR CULT: SIGNIFICANT CHANGE UP
BASOPHILS # BLD AUTO: 0.01 K/UL — SIGNIFICANT CHANGE UP (ref 0–0.2)
BASOPHILS NFR BLD AUTO: 0.2 % — SIGNIFICANT CHANGE UP (ref 0–2)
BILIRUB SERPL-MCNC: 0.7 MG/DL — SIGNIFICANT CHANGE UP (ref 0.2–1.2)
BUN SERPL-MCNC: 12 MG/DL — SIGNIFICANT CHANGE UP (ref 7–23)
CALCIUM SERPL-MCNC: 9.1 MG/DL — SIGNIFICANT CHANGE UP (ref 8.4–10.5)
CHLORIDE SERPL-SCNC: 102 MMOL/L — SIGNIFICANT CHANGE UP (ref 98–107)
CK SERPL-CCNC: 1967 U/L — HIGH (ref 30–200)
CO2 SERPL-SCNC: 25 MMOL/L — SIGNIFICANT CHANGE UP (ref 22–31)
CREAT SERPL-MCNC: 1 MG/DL — SIGNIFICANT CHANGE UP (ref 0.5–1.3)
EOSINOPHIL # BLD AUTO: 0 K/UL — SIGNIFICANT CHANGE UP (ref 0–0.5)
EOSINOPHIL NFR BLD AUTO: 0 % — SIGNIFICANT CHANGE UP (ref 0–6)
GLUCOSE SERPL-MCNC: 219 MG/DL — HIGH (ref 70–99)
HCT VFR BLD CALC: 36.5 % — LOW (ref 39–50)
HGB BLD-MCNC: 12.5 G/DL — LOW (ref 13–17)
IMM GRANULOCYTES NFR BLD AUTO: 0.2 % — SIGNIFICANT CHANGE UP (ref 0–1.5)
LYMPHOCYTES # BLD AUTO: 1.34 K/UL — SIGNIFICANT CHANGE UP (ref 1–3.3)
LYMPHOCYTES # BLD AUTO: 23.1 % — SIGNIFICANT CHANGE UP (ref 13–44)
MAGNESIUM SERPL-MCNC: 1.8 MG/DL — SIGNIFICANT CHANGE UP (ref 1.6–2.6)
MCHC RBC-ENTMCNC: 32 PG — SIGNIFICANT CHANGE UP (ref 27–34)
MCHC RBC-ENTMCNC: 34.2 % — SIGNIFICANT CHANGE UP (ref 32–36)
MCV RBC AUTO: 93.4 FL — SIGNIFICANT CHANGE UP (ref 80–100)
MONOCYTES # BLD AUTO: 0.65 K/UL — SIGNIFICANT CHANGE UP (ref 0–0.9)
MONOCYTES NFR BLD AUTO: 11.2 % — SIGNIFICANT CHANGE UP (ref 2–14)
NEUTROPHILS # BLD AUTO: 3.78 K/UL — SIGNIFICANT CHANGE UP (ref 1.8–7.4)
NEUTROPHILS NFR BLD AUTO: 65.3 % — SIGNIFICANT CHANGE UP (ref 43–77)
NRBC # FLD: 0 K/UL — SIGNIFICANT CHANGE UP (ref 0–0)
PHOSPHATE SERPL-MCNC: 2.2 MG/DL — LOW (ref 2.5–4.5)
PLATELET # BLD AUTO: 190 K/UL — SIGNIFICANT CHANGE UP (ref 150–400)
PMV BLD: 10.8 FL — SIGNIFICANT CHANGE UP (ref 7–13)
POTASSIUM SERPL-MCNC: 3.3 MMOL/L — LOW (ref 3.5–5.3)
POTASSIUM SERPL-SCNC: 3.3 MMOL/L — LOW (ref 3.5–5.3)
PROT SERPL-MCNC: 6.7 G/DL — SIGNIFICANT CHANGE UP (ref 6–8.3)
RBC # BLD: 3.91 M/UL — LOW (ref 4.2–5.8)
RBC # FLD: 11.9 % — SIGNIFICANT CHANGE UP (ref 10.3–14.5)
SODIUM SERPL-SCNC: 139 MMOL/L — SIGNIFICANT CHANGE UP (ref 135–145)
WBC # BLD: 5.79 K/UL — SIGNIFICANT CHANGE UP (ref 3.8–10.5)
WBC # FLD AUTO: 5.79 K/UL — SIGNIFICANT CHANGE UP (ref 3.8–10.5)

## 2019-12-31 PROCEDURE — 99239 HOSP IP/OBS DSCHRG MGMT >30: CPT | Mod: GC

## 2019-12-31 RX ORDER — POTASSIUM CHLORIDE 20 MEQ
20 PACKET (EA) ORAL
Refills: 0 | Status: DISCONTINUED | OUTPATIENT
Start: 2019-12-31 | End: 2019-12-31

## 2019-12-31 RX ORDER — LABETALOL HCL 100 MG
1 TABLET ORAL
Qty: 0 | Refills: 0 | DISCHARGE

## 2019-12-31 RX ORDER — LABETALOL HCL 100 MG
1 TABLET ORAL
Qty: 0 | Refills: 0 | DISCHARGE
Start: 2019-12-31

## 2019-12-31 RX ORDER — SODIUM,POTASSIUM PHOSPHATES 278-250MG
1 POWDER IN PACKET (EA) ORAL DAILY
Refills: 0 | Status: COMPLETED | OUTPATIENT
Start: 2019-12-31 | End: 2019-12-31

## 2019-12-31 RX ORDER — CEFUROXIME AXETIL 250 MG
1 TABLET ORAL
Qty: 8 | Refills: 0
Start: 2019-12-31 | End: 2020-01-03

## 2019-12-31 RX ADMIN — Medication 3: at 13:11

## 2019-12-31 RX ADMIN — Medication 30 MILLIGRAM(S): at 12:38

## 2019-12-31 RX ADMIN — Medication 2: at 09:32

## 2019-12-31 RX ADMIN — HEPARIN SODIUM 5000 UNIT(S): 5000 INJECTION INTRAVENOUS; SUBCUTANEOUS at 06:52

## 2019-12-31 RX ADMIN — Medication 20 MILLIEQUIVALENT(S): at 12:38

## 2019-12-31 RX ADMIN — Medication 1 PACKET(S): at 12:38

## 2019-12-31 RX ADMIN — Medication 30 MILLIGRAM(S): at 00:50

## 2019-12-31 RX ADMIN — Medication 20 MILLIEQUIVALENT(S): at 09:31

## 2019-12-31 RX ADMIN — Medication 200 MILLIGRAM(S): at 06:51

## 2019-12-31 NOTE — PROGRESS NOTE ADULT - PROBLEM SELECTOR PLAN 3
- continue tamiflu (30 BID) for 5 days - continue tamiflu (30 BID) for 5 days (ending January 3rd) - complicated UTI - on ceftriaxone - change to Ceftin as outpatient for total of 7 days of treatment.  - urine culture: NGTD

## 2019-12-31 NOTE — PROGRESS NOTE ADULT - PROBLEM SELECTOR PLAN 8
- elevated T.bili to 1.7 now .9 most likely 2/2 sepsis  - no concern for intraabdominal infection  - continue IVF and trend - patient on metformin and glimepiride at home  - FS, ISS premeal, qhs  - A1c 7.1  HTN  -On labetalol at home  -Trend Bp's

## 2019-12-31 NOTE — PROGRESS NOTE ADULT - PROBLEM SELECTOR PLAN 9
- patient on metformin and glimepiride at home  - FS, ISS premeal, qhs  - A1c 7.1  HTN  -On labetalol at home  -Trend Bp's - DVT ppx: HSQ  - Diet: DASH/CC  -PT Consult: Pending  Transitions of Care Status:  1.  Name of PCP: Dr. Quirino Pena. [phone]  2.  PCP Contacted on Admission: [ ] Y    [ ] N   3.  PCP contacted at Discharge: [ ] Y    [ ] N    [ ] N/A  4.  Post-Discharge Appointment Date and Location:  5.  Summary of Handoff given to PCP: - DVT ppx: HSQ  - Diet: DASH/CC  -PT Consult: Pending  Transitions of Care Status:  1.  Name of PCP: Dr. Quirino Pena. [phone]  2.  PCP Contacted on Admission: [ ] Y    [x] N   3.  PCP contacted at Discharge: [x] Y    [ ] N    [ ] N/A  4.  Post-Discharge Appointment Date and Location: Dr. ISLAS verbally confirmed that patient should come in on Monday as a walk-in  5.  Summary of Handoff given to PCP: 69M DM type 2, admitted for severe sepsis POA from UTI and Flu A, complicated by acute metabolic encephalopathy and rhabdomyolysis. improved rhabdo. improved creatinine. 4 more days of ceftin and 2 more days of tamiflu

## 2019-12-31 NOTE — PROGRESS NOTE ADULT - PROBLEM SELECTOR PLAN 1
- patient presented with altered mental status most likely 2/2 to infection. Mental status resolved  -s/p fluids, abx, now back at baseline  - No acute findings on CTH. No significant electrolyte imbalances  - UA positive for UTI, RVP positive for influenza  - continue fluids, tamiflu, abx for UTI

## 2019-12-31 NOTE — PROGRESS NOTE ADULT - PROBLEM SELECTOR PLAN 2
- patient febrile, tachycardic on admission 2/2 to influenza and UTI. No leukocytosis, however 6.9% bands.  -Elevated lactate of 3.4 on admission, downtrended to 1.6  - UCx, blood cultures NGTD  - continue fluids and abx - continue tamiflu (30 BID) for 5 days (ending January 3rd)

## 2019-12-31 NOTE — PROGRESS NOTE ADULT - ASSESSMENT
69 M with PMH of DM presented with altered mental status 2/2 sepsis from the Flu and UTI. Also with elevated CK 2/2 to rhabdomyolysis 69 M with PMH of DM presented with altered mental status 2/2 sepsis POA from the Flu and UTI. Also with elevated CK 2/2 to rhabdomyolysis

## 2019-12-31 NOTE — PROGRESS NOTE ADULT - PROBLEM SELECTOR PLAN 10
- DVT ppx: HSQ  - Diet: DASH/CC  -PT Consult: Pending  Transitions of Care Status:  1.  Name of PCP: Dr. Quirino Pena. [phone]  2.  PCP Contacted on Admission: [ ] Y    [ ] N   3.  PCP contacted at Discharge: [ ] Y    [ ] N    [ ] N/A  4.  Post-Discharge Appointment Date and Location:  5.  Summary of Handoff given to PCP:

## 2019-12-31 NOTE — PROGRESS NOTE ADULT - SUBJECTIVE AND OBJECTIVE BOX
***************************************************************  Linda Swann, PGY1  Internal Medicine   pager: NS: [phone] [facility]: [phone]  ***************************************************************    PROGRESS NOTE:     Patient is a 69y old  Male who presents with a chief complaint of altered mental status, sepsis (30 Dec 2019 09:46)      SUBJECTIVE / OVERNIGHT EVENTS:    ADDITIONAL REVIEW OF SYSTEMS: 10 point ROS negative except per HPI    MEDICATIONS  (STANDING):  cefTRIAXone   IVPB 1000 milliGRAM(s) IV Intermittent every 24 hours  dextrose 5%. 1000 milliLiter(s) (50 mL/Hr) IV Continuous <Continuous>  dextrose 50% Injectable 12.5 Gram(s) IV Push once  dextrose 50% Injectable 25 Gram(s) IV Push once  dextrose 50% Injectable 25 Gram(s) IV Push once  heparin  Injectable 5000 Unit(s) SubCutaneous every 8 hours  insulin lispro (HumaLOG) corrective regimen sliding scale   SubCutaneous three times a day before meals  insulin lispro (HumaLOG) corrective regimen sliding scale   SubCutaneous at bedtime  labetalol 200 milliGRAM(s) Oral two times a day  oseltamivir 30 milliGRAM(s) Oral two times a day  sodium chloride 0.9%. 1000 milliLiter(s) (100 mL/Hr) IV Continuous <Continuous>    MEDICATIONS  (PRN):  acetaminophen   Tablet .. 650 milliGRAM(s) Oral every 6 hours PRN Temp greater or equal to 38C (100.4F)  dextrose 40% Gel 15 Gram(s) Oral once PRN Blood Glucose LESS THAN 70 milliGRAM(s)/deciliter  glucagon  Injectable 1 milliGRAM(s) IntraMuscular once PRN Glucose LESS THAN 70 milligrams/deciliter      CAPILLARY BLOOD GLUCOSE      POCT Blood Glucose.: 186 mg/dL (30 Dec 2019 21:36)  POCT Blood Glucose.: 253 mg/dL (30 Dec 2019 18:19)  POCT Blood Glucose.: 240 mg/dL (30 Dec 2019 13:00)  POCT Blood Glucose.: 222 mg/dL (30 Dec 2019 08:58)    I&O's Summary      PHYSICAL EXAM:  Vital Signs Last 24 Hrs  T(C): 38.3 (30 Dec 2019 20:49), Max: 38.3 (30 Dec 2019 20:49)  T(F): 100.9 (30 Dec 2019 20:49), Max: 100.9 (30 Dec 2019 20:49)  HR: 107 (30 Dec 2019 20:49) (104 - 125)  BP: 162/94 (30 Dec 2019 20:49) (150/89 - 162/94)  BP(mean): --  RR: 18 (30 Dec 2019 20:49) (17 - 18)  SpO2: 98% (30 Dec 2019 20:49) (97% - 98%)    PHYSICAL EXAM:  Gen: Alert, well-developed, NAD  HEENT: NCAT, PERRL, EOMI, conjunctiva clear, sclera anicteric, no erythema or exudates in the oropharynx, mmm  Neck: Supple, no JVD  CV: RRR, S1S2, no m/r/g  Resp: CTAB, normal respiratory effort  Abd: Soft, nontender, nondistended, normal bowel sounds  Ext: + peripheral pulses, no edema, no clubbing or cyanosis  Neuro: AOx3, CN2-12 grossly intact  SKIN: No rashes, ecchymoses    LABS:                        12.5   5.79  )-----------( 190      ( 31 Dec 2019 04:58 )             36.5     12-30    135  |  103  |  21  ----------------------------<  246<H>  3.9   |  23  |  1.16    Ca    8.8      30 Dec 2019 05:28  Phos  1.5     12-30  Mg     1.6     12-30    TPro  6.3  /  Alb  3.2<L>  /  TBili  0.9  /  DBili  x   /  AST  89<H>  /  ALT  34  /  AlkPhos  48  12-30    PT/INR - ( 29 Dec 2019 14:50 )   PT: 13.2 SEC;   INR: 1.18          PTT - ( 29 Dec 2019 14:50 )  PTT:29.1 SEC  CARDIAC MARKERS ( 30 Dec 2019 05:28 )  x     / x     / 3183 u/L / x     / x      CARDIAC MARKERS ( 29 Dec 2019 14:54 )  x     / x     / 2425 u/L / 3.63 ng/mL / x          Urinalysis Basic - ( 29 Dec 2019 18:27 )    Color: LIGHT YELLOW / Appearance: CLEAR / S.025 / pH: 6.0  Gluc: >1000 / Ketone: SMALL  / Bili: NEGATIVE / Urobili: NORMAL   Blood: MODERATE / Protein: 200 / Nitrite: POSITIVE   Leuk Esterase: NEGATIVE / RBC: 3-5 / WBC 6-10   Sq Epi: OCC / Non Sq Epi: x / Bacteria: NEGATIVE        Culture - Urine (collected 29 Dec 2019 22:07)  Source: URINE MIDSTREAM  Preliminary Report (30 Dec 2019 09:32):    NO GROWTH TO DATE    Culture - Blood (collected 29 Dec 2019 15:21)  Source: BLOOD VENOUS  Preliminary Report (30 Dec 2019 15:22):    NO ORGANISMS ISOLATED    NO ORGANISMS ISOLATED AT 24 HOURS    Culture - Blood (collected 29 Dec 2019 15:21)  Source: BLOOD PERIPHERAL  Preliminary Report (30 Dec 2019 15:22):    NO ORGANISMS ISOLATED    NO ORGANISMS ISOLATED AT 24 HOURS        RADIOLOGY & ADDITIONAL TESTS:  Results Reviewed:   Imaging Personally Reviewed:  Electrocardiogram Personally Reviewed:    COORDINATION OF CARE:  Care Discussed with Consultants/Other Providers [Y/N]:  Prior or Outpatient Records Reviewed [Y/N]: ***************************************************************  Thujanice Johnnaviv, PGY1  Internal Medicine   pager: NS: [phone] [facility]: [phone]  ***************************************************************    PROGRESS NOTE:     Patient is a 69y old  Male who presents with a chief complaint of altered mental status, sepsis (30 Dec 2019 09:46)      SUBJECTIVE / OVERNIGHT EVENTS:  Overnight, patient had a temperature of 100.9 and received tylenol. No other events. Patient is complaining of hiccups this morning, but it is not causing him much distress. He denies chest pain, shortness of breath, abdominal pain, nausea, vomiting.     ADDITIONAL REVIEW OF SYSTEMS: 10 point ROS negative except per HPI    MEDICATIONS  (STANDING):  cefTRIAXone   IVPB 1000 milliGRAM(s) IV Intermittent every 24 hours  dextrose 5%. 1000 milliLiter(s) (50 mL/Hr) IV Continuous <Continuous>  dextrose 50% Injectable 12.5 Gram(s) IV Push once  dextrose 50% Injectable 25 Gram(s) IV Push once  dextrose 50% Injectable 25 Gram(s) IV Push once  heparin  Injectable 5000 Unit(s) SubCutaneous every 8 hours  insulin lispro (HumaLOG) corrective regimen sliding scale   SubCutaneous three times a day before meals  insulin lispro (HumaLOG) corrective regimen sliding scale   SubCutaneous at bedtime  labetalol 200 milliGRAM(s) Oral two times a day  oseltamivir 30 milliGRAM(s) Oral two times a day  sodium chloride 0.9%. 1000 milliLiter(s) (100 mL/Hr) IV Continuous <Continuous>    MEDICATIONS  (PRN):  acetaminophen   Tablet .. 650 milliGRAM(s) Oral every 6 hours PRN Temp greater or equal to 38C (100.4F)  dextrose 40% Gel 15 Gram(s) Oral once PRN Blood Glucose LESS THAN 70 milliGRAM(s)/deciliter  glucagon  Injectable 1 milliGRAM(s) IntraMuscular once PRN Glucose LESS THAN 70 milligrams/deciliter      CAPILLARY BLOOD GLUCOSE      POCT Blood Glucose.: 186 mg/dL (30 Dec 2019 21:36)  POCT Blood Glucose.: 253 mg/dL (30 Dec 2019 18:19)  POCT Blood Glucose.: 240 mg/dL (30 Dec 2019 13:00)  POCT Blood Glucose.: 222 mg/dL (30 Dec 2019 08:58)    I&O's Summary      PHYSICAL EXAM:  Vital Signs Last 24 Hrs  T(C): 38.3 (30 Dec 2019 20:49), Max: 38.3 (30 Dec 2019 20:49)  T(F): 100.9 (30 Dec 2019 20:49), Max: 100.9 (30 Dec 2019 20:49)  HR: 107 (30 Dec 2019 20:49) (104 - 125)  BP: 162/94 (30 Dec 2019 20:49) (150/89 - 162/94)  BP(mean): --  RR: 18 (30 Dec 2019 20:49) (17 - 18)  SpO2: 98% (30 Dec 2019 20:49) (97% - 98%)    PHYSICAL EXAM:  Gen: Alert, well-developed, NAD  HEENT: NCAT, PERRL, EOMI, conjunctiva clear, sclera anicteric, no erythema or exudates in the oropharynx, mmm  Neck: Supple, no JVD  CV: RRR, S1S2, no m/r/g  Resp: CTAB, normal respiratory effort  Abd: Soft, nontender, nondistended, normal bowel sounds  Ext: + peripheral pulses, no edema, no clubbing or cyanosis  Neuro: AOx3, CN2-12 grossly intact  SKIN: No rashes, ecchymoses    LABS:                        12.5   5.79  )-----------( 190      ( 31 Dec 2019 04:58 )             36.5     12-30    135  |  103  |  21  ----------------------------<  246<H>  3.9   |  23  |  1.16    Ca    8.8      30 Dec 2019 05:28  Phos  1.5     12-30  Mg     1.6     12-30    TPro  6.3  /  Alb  3.2<L>  /  TBili  0.9  /  DBili  x   /  AST  89<H>  /  ALT  34  /  AlkPhos  48  12-30    PT/INR - ( 29 Dec 2019 14:50 )   PT: 13.2 SEC;   INR: 1.18          PTT - ( 29 Dec 2019 14:50 )  PTT:29.1 SEC  CARDIAC MARKERS ( 30 Dec 2019 05:28 )  x     / x     / 3183 u/L / x     / x      CARDIAC MARKERS ( 29 Dec 2019 14:54 )  x     / x     / 2425 u/L / 3.63 ng/mL / x          Urinalysis Basic - ( 29 Dec 2019 18:27 )    Color: LIGHT YELLOW / Appearance: CLEAR / S.025 / pH: 6.0  Gluc: >1000 / Ketone: SMALL  / Bili: NEGATIVE / Urobili: NORMAL   Blood: MODERATE / Protein: 200 / Nitrite: POSITIVE   Leuk Esterase: NEGATIVE / RBC: 3-5 / WBC 6-10   Sq Epi: OCC / Non Sq Epi: x / Bacteria: NEGATIVE        Culture - Urine (collected 29 Dec 2019 22:07)  Source: URINE MIDSTREAM  Preliminary Report (30 Dec 2019 09:32):    NO GROWTH TO DATE    Culture - Blood (collected 29 Dec 2019 15:21)  Source: BLOOD VENOUS  Preliminary Report (30 Dec 2019 15:22):    NO ORGANISMS ISOLATED    NO ORGANISMS ISOLATED AT 24 HOURS    Culture - Blood (collected 29 Dec 2019 15:21)  Source: BLOOD PERIPHERAL  Preliminary Report (30 Dec 2019 15:22):    NO ORGANISMS ISOLATED    NO ORGANISMS ISOLATED AT 24 HOURS        RADIOLOGY & ADDITIONAL TESTS:  Results Reviewed:   Imaging Personally Reviewed:  Electrocardiogram Personally Reviewed:    COORDINATION OF CARE:  Care Discussed with Consultants/Other Providers [Y/N]:  Prior or Outpatient Records Reviewed [Y/N]:

## 2019-12-31 NOTE — DISCHARGE NOTE NURSING/CASE MANAGEMENT/SOCIAL WORK - PATIENT PORTAL LINK FT
You can access the FollowMyHealth Patient Portal offered by [facility] by registering at the following website: [email or web address]. By joining [facility]’s FollowMyHealth portal, you will also be able to view your health information using other applications (apps) compatible with our system.

## 2019-12-31 NOTE — PROGRESS NOTE ADULT - PROBLEM SELECTOR PLAN 5
- patient with elevated CK to 2425 given 2L Bolus in ED and then started on 100ml/hr drip. Repeat CK 3183. Given another 1L Bolus  - continue hydration with IVF  - trend CK, improved to 1967 this Am - patient with ROLF with Cr 1.42 now 1.16 most likely 2/2 to infection vs rhabdo  - continue fluids, abx for UTI  - ROLF resolved, Cr now 1.0

## 2019-12-31 NOTE — PROGRESS NOTE ADULT - ATTENDING COMMENTS
69M DM type 2, admitted for severe sepsis POA from UTI and Flu A, complicated by acute metabolic encephalopathy and rhabdomyolysis.
69M DM type 2, admitted for severe sepsis POA from UTI and Flu A, complicated by acute metabolic encephalopathy and rhabdomyolysis.  Patient medically optimized for discharge.  Discharge planning 40 minutes - discussed with patient and family.    Billy Park MD  [facility] Hospitalist  Pager# [phone]

## 2019-12-31 NOTE — PROGRESS NOTE ADULT - PROBLEM SELECTOR PLAN 7
- elevated troponin to 79, stable  - no concern for ACS - elevated T.bili to 1.7 now .9 most likely 2/2 sepsis  - no concern for intraabdominal infection  - continue IVF and trend

## 2020-01-03 LAB
BACTERIA BLD CULT: SIGNIFICANT CHANGE UP
BACTERIA BLD CULT: SIGNIFICANT CHANGE UP

## 2020-09-23 NOTE — DISCHARGE NOTE NURSING/CASE MANAGEMENT/SOCIAL WORK - NSDCPEFALRISK_GEN_ALL_CORE
[facility]ley from [facility] called and stated patient was unable to schedule a visit on Friday. He will be scheduling on Monday or Tuesday next. The last 3 INRs has been therapeutic. Advised Ashley it would be safe to reschedule recheck for next week.    Cyndi Fitzpatrick RN on 9/23/2020 at 12:11 PM        
Patient information on fall and injury prevention

## 2024-06-10 NOTE — PATIENT PROFILE ADULT - BRADEN NUTRITION
How Did Your Itching Occur?: gradual in onset  (over a period of years) How Severe Is Your Itching?: moderate (3) adequate